# Patient Record
Sex: MALE | Race: BLACK OR AFRICAN AMERICAN | Employment: UNEMPLOYED | URBAN - METROPOLITAN AREA
[De-identification: names, ages, dates, MRNs, and addresses within clinical notes are randomized per-mention and may not be internally consistent; named-entity substitution may affect disease eponyms.]

---
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
## 2022-08-16 ENCOUNTER — OFFICE VISIT (OUTPATIENT)
Dept: FAMILY MEDICINE CLINIC | Age: 22
End: 2022-08-16
Payer: COMMERCIAL

## 2022-08-16 VITALS
HEIGHT: 73 IN | WEIGHT: 195.2 LBS | SYSTOLIC BLOOD PRESSURE: 120 MMHG | TEMPERATURE: 97.3 F | BODY MASS INDEX: 25.87 KG/M2 | OXYGEN SATURATION: 99 % | HEART RATE: 87 BPM | DIASTOLIC BLOOD PRESSURE: 70 MMHG

## 2022-08-16 DIAGNOSIS — Z00.00 ENCOUNTER FOR WELLNESS EXAMINATION: ICD-10-CM

## 2022-08-16 DIAGNOSIS — Z00.00 ROUTINE HEALTH MAINTENANCE: Primary | ICD-10-CM

## 2022-08-16 PROCEDURE — 99203 OFFICE O/P NEW LOW 30 MIN: CPT

## 2022-08-16 PROCEDURE — 1036F TOBACCO NON-USER: CPT

## 2022-08-16 PROCEDURE — G8419 CALC BMI OUT NRM PARAM NOF/U: HCPCS

## 2022-08-16 PROCEDURE — G8427 DOCREV CUR MEDS BY ELIG CLIN: HCPCS

## 2022-08-16 ASSESSMENT — PATIENT HEALTH QUESTIONNAIRE - PHQ9
SUM OF ALL RESPONSES TO PHQ9 QUESTIONS 1 & 2: 0
1. LITTLE INTEREST OR PLEASURE IN DOING THINGS: 0
2. FEELING DOWN, DEPRESSED OR HOPELESS: 0
SUM OF ALL RESPONSES TO PHQ QUESTIONS 1-9: 0

## 2022-08-16 ASSESSMENT — ENCOUNTER SYMPTOMS
COUGH: 0
CONSTIPATION: 0
EYE PAIN: 0
SORE THROAT: 0
VOMITING: 0
BACK PAIN: 0
NAUSEA: 0
ABDOMINAL PAIN: 0
SHORTNESS OF BREATH: 0
DIARRHEA: 0

## 2022-08-16 NOTE — PROGRESS NOTES
Visit Information    Have you changed or started any medications since your last visit including any over-the-counter medicines, vitamins, or herbal medicines? no   Have you stopped taking any of your medications? Is so, why? -  no  Are you having any side effects from any of your medications? - no    Have you seen any other physician or provider since your last visit?  no   Have you had any other diagnostic tests since your last visit?  no   Have you been seen in the emergency room and/or had an admission in a hospital since we last saw you?  no   Have you had your routine dental cleaning in the past 6 months?  no     Do you have an active MyChart account? If no, what is the barrier?   No: Inactive    Patient Care Team:  VENICE La CNP as PCP - General (Certified Nurse Practitioner)  VENICE La CNP (Nurse Practitioner)  VENICE La CNP (Nurse Practitioner)    Medical History Review  Past Medical, Family, and Social History reviewed and does not contribute to the patient presenting condition    Health Maintenance   Topic Date Due    Depression Screen  Never done    HIV screen  Never done    Hepatitis C screen  Never done    COVID-19 Vaccine (2 - Booster for Gianna series) 12/22/2021    Flu vaccine (1) 09/01/2022    DTaP/Tdap/Td vaccine (6 - Td or Tdap) 10/23/2023    Hepatitis A vaccine  Completed    Hepatitis B vaccine  Completed    Hib vaccine  Completed    HPV vaccine  Completed    Varicella vaccine  Completed    Meningococcal (ACWY) vaccine  Completed    Pneumococcal 0-64 years Vaccine  Aged Out

## 2022-08-16 NOTE — PATIENT INSTRUCTIONS
Thank you for letting us take care of you today. We hope all your questions were addressed. If a question was overlooked or something else comes to mind after you return home, please contact a member of your Care Team listed below. Your Care Team at [place] [address] is Team #1  Deanna Maharaj MD (Faculty)  Alannah Calvin MD(Resident)  Ankita Mattson MD (Resident)  Barclay Ormond, DO (Resident)  Adelia Mcgill., MARA Ricardo., LEYDA Romero., KODY Morrison., Darrius Albarado., [facility] [place] office)  Susana Ruiz, [address] (Clinical Practice Manager)  Nikos Ho [facility] (Clinical Pharmacist)     Office phone number: [phone]    If you need to get in right away due to illness, please be advised we have \"Same Day\" appointments available Monday-Friday. Please call us at [phone] option #3 to schedule your \"Same Day\" appointment.

## 2022-08-16 NOTE — PROGRESS NOTES
[address] [facility] - Outpatient Note      Subjective:    Lennox Becker is a 24 y.o. male with  has a past medical history of ADD (attention deficit disorder). Presented to the office today for:  Chief Complaint   Patient presents with    Annual Exam     New patient - est care / labs wanted        HPI  Patient is here to establish care and routine health maintenance. Denies any health problems. Not taking any medications. Smokes weed \"a lot\" 10 g /day, everyday  Denies Alcohol  Has seasonal allergies, Claritin as needed  migraine, motrin helps    Sexually active with 2 female partners, uses condoms  Denies STIs     Covid vaccine 1 dose J&J      Review of Systems   Constitutional:  Negative for chills and fever. HENT:  Negative for ear pain and sore throat. Eyes:  Negative for pain. Respiratory:  Negative for cough and shortness of breath. Cardiovascular:  Negative for chest pain and leg swelling. Gastrointestinal:  Negative for abdominal pain, constipation, diarrhea, nausea and vomiting. Genitourinary:  Negative for dysuria. Musculoskeletal:  Negative for back pain and neck pain. Skin:  Negative for rash. Neurological:  Positive for headaches. The patient has a   Family History   Problem Relation Age of Onset    [de-identified] / [facility]        Objective:    /70 (Site: Left Upper Arm, Position: Sitting, Cuff Size: Large Adult)   Pulse 87   Temp 97.3 °F (36.3 °C) (Temporal)   Ht 6' 1\" (1.854 m)   Wt 195 lb 3.2 oz (88.5 kg)   SpO2 99%   BMI 25.75 kg/m²    BP Readings from Last 3 Encounters:   08/16/22 120/70   12/15/17 139/86   08/01/17 106/64 (15 %, Z = -1.04 /  32 %, Z = -0.47)*     *BP percentiles are based on the 2017 AAP Clinical Practice Guideline for boys       Physical Exam  Vitals and nursing note reviewed. Constitutional:       Appearance: He is not ill-appearing.    HENT:      Head: Normocephalic and atraumatic. Cardiovascular:      Rate and Rhythm: Normal rate. Pulses: Normal pulses. Heart sounds: Normal heart sounds. No murmur heard. No friction rub. No gallop. Pulmonary:      Effort: Pulmonary effort is normal. No respiratory distress. Breath sounds: Normal breath sounds. No wheezing. Abdominal:      General: Abdomen is flat. Bowel sounds are normal.      Palpations: Abdomen is soft. Musculoskeletal:      Right lower leg: No edema. Left lower leg: No edema. Neurological:      Mental Status: He is alert and oriented to person, place, and time. Lab Results   Component Value Date    WBC 6.9 09/15/2018    HGB 13.3 09/15/2018    HCT 40.8 09/15/2018     09/15/2018     09/15/2018    K 5.0 (H) 09/15/2018     09/15/2018    CREATININE 0.82 09/15/2018    BUN 11 09/15/2018    CO2 23 09/15/2018    INR 1.2 09/15/2018     Lab Results   Component Value Date    CALCIUM 8.5 09/21/2015     No results found for: LDLCALC, LDLCHOLESTEROL, LDLDIRECT    Assessment and Plan:    1. Routine health maintenance    - HIV Screen; Future  - Hepatitis C Antibody; Future  - Lipid Panel; Future    Follow up in 6 months. Requested Prescriptions      No prescriptions requested or ordered in this encounter       Medications Discontinued During This Encounter   Medication Reason    acetaminophen (TYLENOL) 325 MG tablet LIST CLEANUP    bacitracin 500 UNIT/GM ointment LIST CLEANUP    benzocaine (ORAJEL) 10 % mucosal gel LIST CLEANUP    ibuprofen (ADVIL;MOTRIN) 600 MG tablet LIST CLEANUP    Lisdexamfetamine Dimesylate (VYVANSE) 20 MG CAPS LIST CLEANUP       Kristie Ring received counseling on the following healthy behaviors: nutrition, exercise and medication adherence    Discussed use,benefit, and side effects of prescribed medications. Barriers to medication compliance addressed. All patient questions answered. Pt voiced understanding.      Return in about 6 months (around 2/16/2023) for lab results . Disclaimer: Some orall of this note was transcribed using voice-recognition software. This may cause typographical errors occasionally. Although all effort is made to fix these errors, please do not hesitate to contact our office if there Yamila Robbie concern with the understanding of this note.

## 2022-09-01 ENCOUNTER — APPOINTMENT (OUTPATIENT)
Dept: GENERAL RADIOLOGY | Age: 22
DRG: 113 | End: 2022-09-01
Payer: COMMERCIAL

## 2022-09-01 ENCOUNTER — ANESTHESIA (OUTPATIENT)
Dept: OPERATING ROOM | Age: 22
DRG: 113 | End: 2022-09-01
Payer: COMMERCIAL

## 2022-09-01 ENCOUNTER — APPOINTMENT (OUTPATIENT)
Dept: CT IMAGING | Age: 22
DRG: 113 | End: 2022-09-01
Payer: COMMERCIAL

## 2022-09-01 ENCOUNTER — HOSPITAL ENCOUNTER (INPATIENT)
Age: 22
LOS: 4 days | Discharge: HOME | DRG: 113 | End: 2022-09-05
Attending: EMERGENCY MEDICINE | Admitting: INTERNAL MEDICINE
Payer: COMMERCIAL

## 2022-09-01 ENCOUNTER — HOSPITAL ENCOUNTER (EMERGENCY)
Age: 22
Discharge: HOME | DRG: 113 | End: 2022-09-01
Attending: EMERGENCY MEDICINE
Payer: COMMERCIAL

## 2022-09-01 ENCOUNTER — ANESTHESIA EVENT (OUTPATIENT)
Dept: OPERATING ROOM | Age: 22
DRG: 113 | End: 2022-09-01
Payer: COMMERCIAL

## 2022-09-01 VITALS
BODY MASS INDEX: 25.73 KG/M2 | WEIGHT: 195 LBS | OXYGEN SATURATION: 96 % | HEART RATE: 63 BPM | RESPIRATION RATE: 16 BRPM | SYSTOLIC BLOOD PRESSURE: 136 MMHG | DIASTOLIC BLOOD PRESSURE: 85 MMHG | TEMPERATURE: 97.8 F

## 2022-09-01 DIAGNOSIS — J05.10 ACUTE EPIGLOTTITIS WITHOUT AIRWAY OBSTRUCTION: Primary | ICD-10-CM

## 2022-09-01 DIAGNOSIS — J02.9 SORE THROAT: Primary | ICD-10-CM

## 2022-09-01 PROBLEM — J05.11 ACUTE EPIGLOTTITIS WITH AIRWAY OBSTRUCTION: Status: ACTIVE | Noted: 2022-09-01

## 2022-09-01 LAB
ABSOLUTE EOS #: 0 K/UL (ref 0–0.4)
ABSOLUTE IMMATURE GRANULOCYTE: 0 K/UL (ref 0–0.3)
ABSOLUTE LYMPH #: 0.49 K/UL (ref 1–4.8)
ABSOLUTE MONO #: 1.97 K/UL (ref 0.1–1.4)
ALLEN TEST: POSITIVE
ANION GAP SERPL CALCULATED.3IONS-SCNC: 11 MMOL/L (ref 9–17)
BASOPHILS # BLD: 0 % (ref 0–2)
BASOPHILS ABSOLUTE: 0 K/UL (ref 0–0.2)
BUN BLDV-MCNC: 11 MG/DL (ref 6–20)
CALCIUM SERPL-MCNC: 9.5 MG/DL (ref 8.6–10.4)
CHLORIDE BLD-SCNC: 102 MMOL/L (ref 98–107)
CO2: 24 MMOL/L (ref 20–31)
CREAT SERPL-MCNC: 0.69 MG/DL (ref 0.7–1.2)
EOSINOPHILS RELATIVE PERCENT: 0 % (ref 1–4)
FIO2: 40
GFR AFRICAN AMERICAN: >60 ML/MIN
GFR NON-AFRICAN AMERICAN: >60 ML/MIN
GFR SERPL CREATININE-BSD FRML MDRD: ABNORMAL ML/MIN/{1.73_M2}
GLUCOSE BLD-MCNC: 107 MG/DL (ref 70–99)
GLUCOSE BLD-MCNC: 125 MG/DL (ref 74–100)
HCT VFR BLD CALC: 40.7 % (ref 40.7–50.3)
HEMOGLOBIN: 14.4 G/DL (ref 13–17)
IMMATURE GRANULOCYTES: 0 %
LYMPHOCYTES # BLD: 2 % (ref 25–45)
MCH RBC QN AUTO: 30.1 PG (ref 25.2–33.5)
MCHC RBC AUTO-ENTMCNC: 35.4 G/DL (ref 28.4–34.8)
MCV RBC AUTO: 85 FL (ref 82.6–102.9)
MODE: ABNORMAL
MONOCYTES # BLD: 8 % (ref 2–8)
MORPHOLOGY: NORMAL
NRBC AUTOMATED: 0 PER 100 WBC
O2 DEVICE/FLOW/%: ABNORMAL
PDW BLD-RTO: 13.5 % (ref 11.8–14.4)
PLATELET # BLD: 336 K/UL (ref 138–453)
PMV BLD AUTO: 10.7 FL (ref 8.1–13.5)
POC HCO3: 25.9 MMOL/L (ref 21–28)
POC O2 SATURATION: 100 % (ref 94–98)
POC PCO2: 42.4 MM HG (ref 35–48)
POC PH: 7.39 (ref 7.35–7.45)
POC PO2: 187.4 MM HG (ref 83–108)
POSITIVE BASE EXCESS, ART: 1 (ref 0–3)
POTASSIUM SERPL-SCNC: 4.1 MMOL/L (ref 3.7–5.3)
RBC # BLD: 4.79 M/UL (ref 4.21–5.77)
S PYO AG THROAT QL: NEGATIVE
SAMPLE SITE: ABNORMAL
SEG NEUTROPHILS: 90 % (ref 34–64)
SEGMENTED NEUTROPHILS ABSOLUTE COUNT: 22.14 K/UL (ref 1.8–7.7)
SODIUM BLD-SCNC: 137 MMOL/L (ref 135–144)
SOURCE: NORMAL
WBC # BLD: 24.6 K/UL (ref 4.5–13.5)

## 2022-09-01 PROCEDURE — 6370000000 HC RX 637 (ALT 250 FOR IP): Performed by: STUDENT IN AN ORGANIZED HEALTH CARE EDUCATION/TRAINING PROGRAM

## 2022-09-01 PROCEDURE — 74018 RADEX ABDOMEN 1 VIEW: CPT

## 2022-09-01 PROCEDURE — 2580000003 HC RX 258: Performed by: OTOLARYNGOLOGY

## 2022-09-01 PROCEDURE — 31515 LARYNGOSCOPY FOR ASPIRATION: CPT | Performed by: OTOLARYNGOLOGY

## 2022-09-01 PROCEDURE — 2580000003 HC RX 258: Performed by: STUDENT IN AN ORGANIZED HEALTH CARE EDUCATION/TRAINING PROGRAM

## 2022-09-01 PROCEDURE — 99253 IP/OBS CNSLTJ NEW/EST LOW 45: CPT | Performed by: OTOLARYNGOLOGY

## 2022-09-01 PROCEDURE — 0CJS8ZZ INSPECTION OF LARYNX, VIA NATURAL OR ARTIFICIAL OPENING ENDOSCOPIC: ICD-10-PCS | Performed by: OTOLARYNGOLOGY

## 2022-09-01 PROCEDURE — 96375 TX/PRO/DX INJ NEW DRUG ADDON: CPT

## 2022-09-01 PROCEDURE — 6360000002 HC RX W HCPCS: Performed by: STUDENT IN AN ORGANIZED HEALTH CARE EDUCATION/TRAINING PROGRAM

## 2022-09-01 PROCEDURE — 82947 ASSAY GLUCOSE BLOOD QUANT: CPT

## 2022-09-01 PROCEDURE — 3700000001 HC ADD 15 MINUTES (ANESTHESIA): Performed by: OTOLARYNGOLOGY

## 2022-09-01 PROCEDURE — 2500000003 HC RX 250 WO HCPCS: Performed by: OTOLARYNGOLOGY

## 2022-09-01 PROCEDURE — 3600000004 HC SURGERY LEVEL 4 BASE: Performed by: OTOLARYNGOLOGY

## 2022-09-01 PROCEDURE — 99283 EMERGENCY DEPT VISIT LOW MDM: CPT

## 2022-09-01 PROCEDURE — 96365 THER/PROPH/DIAG IV INF INIT: CPT

## 2022-09-01 PROCEDURE — 99285 EMERGENCY DEPT VISIT HI MDM: CPT

## 2022-09-01 PROCEDURE — 2700000000 HC OXYGEN THERAPY PER DAY

## 2022-09-01 PROCEDURE — 2709999900 HC NON-CHARGEABLE SUPPLY: Performed by: OTOLARYNGOLOGY

## 2022-09-01 PROCEDURE — 82803 BLOOD GASES ANY COMBINATION: CPT

## 2022-09-01 PROCEDURE — 85025 COMPLETE CBC W/AUTO DIFF WBC: CPT

## 2022-09-01 PROCEDURE — 87040 BLOOD CULTURE FOR BACTERIA: CPT

## 2022-09-01 PROCEDURE — 6360000002 HC RX W HCPCS

## 2022-09-01 PROCEDURE — 2500000003 HC RX 250 WO HCPCS: Performed by: NURSE ANESTHETIST, CERTIFIED REGISTERED

## 2022-09-01 PROCEDURE — 6360000002 HC RX W HCPCS: Performed by: NURSE ANESTHETIST, CERTIFIED REGISTERED

## 2022-09-01 PROCEDURE — 36415 COLL VENOUS BLD VENIPUNCTURE: CPT

## 2022-09-01 PROCEDURE — 3600000014 HC SURGERY LEVEL 4 ADDTL 15MIN: Performed by: OTOLARYNGOLOGY

## 2022-09-01 PROCEDURE — 2000000000 HC ICU R&B

## 2022-09-01 PROCEDURE — 94761 N-INVAS EAR/PLS OXIMETRY MLT: CPT

## 2022-09-01 PROCEDURE — 2580000003 HC RX 258: Performed by: NURSE ANESTHETIST, CERTIFIED REGISTERED

## 2022-09-01 PROCEDURE — 36600 WITHDRAWAL OF ARTERIAL BLOOD: CPT

## 2022-09-01 PROCEDURE — 3700000000 HC ANESTHESIA ATTENDED CARE: Performed by: OTOLARYNGOLOGY

## 2022-09-01 PROCEDURE — 94002 VENT MGMT INPAT INIT DAY: CPT

## 2022-09-01 PROCEDURE — 80048 BASIC METABOLIC PNL TOTAL CA: CPT

## 2022-09-01 PROCEDURE — 84478 ASSAY OF TRIGLYCERIDES: CPT

## 2022-09-01 PROCEDURE — 87880 STREP A ASSAY W/OPTIC: CPT

## 2022-09-01 RX ORDER — IBUPROFEN 600 MG/1
600 TABLET ORAL EVERY 8 HOURS PRN
Qty: 21 TABLET | Refills: 0 | Status: SHIPPED | OUTPATIENT
Start: 2022-09-01 | End: 2022-09-08

## 2022-09-01 RX ORDER — SODIUM CHLORIDE 9 MG/ML
INJECTION, SOLUTION INTRAVENOUS PRN
Status: DISCONTINUED | OUTPATIENT
Start: 2022-09-01 | End: 2022-09-05 | Stop reason: HOSPADM

## 2022-09-01 RX ORDER — ROCURONIUM BROMIDE 10 MG/ML
INJECTION, SOLUTION INTRAVENOUS PRN
Status: DISCONTINUED | OUTPATIENT
Start: 2022-09-01 | End: 2022-09-01 | Stop reason: SDUPTHER

## 2022-09-01 RX ORDER — ALBUTEROL SULFATE 2.5 MG/3ML
2.5 SOLUTION RESPIRATORY (INHALATION) EVERY 4 HOURS PRN
Status: DISCONTINUED | OUTPATIENT
Start: 2022-09-01 | End: 2022-09-04

## 2022-09-01 RX ORDER — SODIUM CHLORIDE 9 MG/ML
INJECTION, SOLUTION INTRAVENOUS CONTINUOUS PRN
Status: DISCONTINUED | OUTPATIENT
Start: 2022-09-01 | End: 2022-09-01 | Stop reason: SDUPTHER

## 2022-09-01 RX ORDER — SUCCINYLCHOLINE/SOD CL,ISO/PF 100 MG/5ML
SYRINGE (ML) INTRAVENOUS PRN
Status: DISCONTINUED | OUTPATIENT
Start: 2022-09-01 | End: 2022-09-01 | Stop reason: SDUPTHER

## 2022-09-01 RX ORDER — DEXAMETHASONE SODIUM PHOSPHATE 10 MG/ML
10 INJECTION INTRAMUSCULAR; INTRAVENOUS EVERY 8 HOURS
Status: DISCONTINUED | OUTPATIENT
Start: 2022-09-03 | End: 2022-09-05 | Stop reason: HOSPADM

## 2022-09-01 RX ORDER — PROPOFOL 10 MG/ML
INJECTION, EMULSION INTRAVENOUS PRN
Status: DISCONTINUED | OUTPATIENT
Start: 2022-09-01 | End: 2022-09-01 | Stop reason: SDUPTHER

## 2022-09-01 RX ORDER — ONDANSETRON 4 MG/1
4 TABLET, ORALLY DISINTEGRATING ORAL EVERY 8 HOURS PRN
Status: DISCONTINUED | OUTPATIENT
Start: 2022-09-01 | End: 2022-09-05 | Stop reason: HOSPADM

## 2022-09-01 RX ORDER — FENTANYL CITRATE 50 UG/ML
INJECTION, SOLUTION INTRAMUSCULAR; INTRAVENOUS PRN
Status: DISCONTINUED | OUTPATIENT
Start: 2022-09-01 | End: 2022-09-01 | Stop reason: SDUPTHER

## 2022-09-01 RX ORDER — PROPOFOL 10 MG/ML
INJECTION, EMULSION INTRAVENOUS
Status: COMPLETED
Start: 2022-09-01 | End: 2022-09-01

## 2022-09-01 RX ORDER — ENOXAPARIN SODIUM 100 MG/ML
40 INJECTION SUBCUTANEOUS DAILY
Status: DISCONTINUED | OUTPATIENT
Start: 2022-09-02 | End: 2022-09-05 | Stop reason: HOSPADM

## 2022-09-01 RX ORDER — MAGNESIUM HYDROXIDE 1200 MG/15ML
LIQUID ORAL CONTINUOUS PRN
Status: COMPLETED | OUTPATIENT
Start: 2022-09-01 | End: 2022-09-01

## 2022-09-01 RX ORDER — GLYCOPYRROLATE 0.2 MG/ML
INJECTION INTRAMUSCULAR; INTRAVENOUS PRN
Status: DISCONTINUED | OUTPATIENT
Start: 2022-09-01 | End: 2022-09-01 | Stop reason: SDUPTHER

## 2022-09-01 RX ORDER — DEXAMETHASONE SODIUM PHOSPHATE 10 MG/ML
10 INJECTION INTRAMUSCULAR; INTRAVENOUS ONCE
Status: COMPLETED | OUTPATIENT
Start: 2022-09-01 | End: 2022-09-01

## 2022-09-01 RX ORDER — SODIUM CHLORIDE 0.9 % (FLUSH) 0.9 %
5-40 SYRINGE (ML) INJECTION PRN
Status: DISCONTINUED | OUTPATIENT
Start: 2022-09-01 | End: 2022-09-05 | Stop reason: HOSPADM

## 2022-09-01 RX ORDER — PROPOFOL 10 MG/ML
5-50 INJECTION, EMULSION INTRAVENOUS CONTINUOUS
Status: DISCONTINUED | OUTPATIENT
Start: 2022-09-01 | End: 2022-09-02

## 2022-09-01 RX ORDER — ACETAMINOPHEN 500 MG
1000 TABLET ORAL 3 TIMES DAILY
Qty: 42 TABLET | Refills: 0 | Status: SHIPPED | OUTPATIENT
Start: 2022-09-01 | End: 2022-09-08

## 2022-09-01 RX ORDER — ACETAMINOPHEN 650 MG/1
650 SUPPOSITORY RECTAL EVERY 6 HOURS PRN
Status: DISCONTINUED | OUTPATIENT
Start: 2022-09-01 | End: 2022-09-05 | Stop reason: HOSPADM

## 2022-09-01 RX ORDER — SODIUM CHLORIDE, SODIUM LACTATE, POTASSIUM CHLORIDE, CALCIUM CHLORIDE 600; 310; 30; 20 MG/100ML; MG/100ML; MG/100ML; MG/100ML
INJECTION, SOLUTION INTRAVENOUS CONTINUOUS PRN
Status: DISCONTINUED | OUTPATIENT
Start: 2022-09-01 | End: 2022-09-01 | Stop reason: SDUPTHER

## 2022-09-01 RX ORDER — PROPOFOL 10 MG/ML
INJECTION, EMULSION INTRAVENOUS CONTINUOUS PRN
Status: DISCONTINUED | OUTPATIENT
Start: 2022-09-01 | End: 2022-09-01 | Stop reason: SDUPTHER

## 2022-09-01 RX ORDER — KETOROLAC TROMETHAMINE 15 MG/ML
15 INJECTION, SOLUTION INTRAMUSCULAR; INTRAVENOUS ONCE
Status: COMPLETED | OUTPATIENT
Start: 2022-09-01 | End: 2022-09-01

## 2022-09-01 RX ORDER — ONDANSETRON 2 MG/ML
4 INJECTION INTRAMUSCULAR; INTRAVENOUS EVERY 6 HOURS PRN
Status: DISCONTINUED | OUTPATIENT
Start: 2022-09-01 | End: 2022-09-05 | Stop reason: HOSPADM

## 2022-09-01 RX ORDER — MIDAZOLAM HYDROCHLORIDE 1 MG/ML
INJECTION INTRAMUSCULAR; INTRAVENOUS PRN
Status: DISCONTINUED | OUTPATIENT
Start: 2022-09-01 | End: 2022-09-01 | Stop reason: SDUPTHER

## 2022-09-01 RX ORDER — SODIUM CHLORIDE 0.9 % (FLUSH) 0.9 %
5-40 SYRINGE (ML) INJECTION EVERY 12 HOURS SCHEDULED
Status: DISCONTINUED | OUTPATIENT
Start: 2022-09-01 | End: 2022-09-05 | Stop reason: HOSPADM

## 2022-09-01 RX ORDER — MIDAZOLAM HYDROCHLORIDE 2 MG/2ML
2 INJECTION, SOLUTION INTRAMUSCULAR; INTRAVENOUS ONCE
Status: COMPLETED | OUTPATIENT
Start: 2022-09-01 | End: 2022-09-01

## 2022-09-01 RX ORDER — ACETAMINOPHEN 325 MG/1
650 TABLET ORAL EVERY 6 HOURS PRN
Status: DISCONTINUED | OUTPATIENT
Start: 2022-09-01 | End: 2022-09-05 | Stop reason: HOSPADM

## 2022-09-01 RX ORDER — POLYETHYLENE GLYCOL 3350 17 G/17G
17 POWDER, FOR SOLUTION ORAL DAILY PRN
Status: DISCONTINUED | OUTPATIENT
Start: 2022-09-01 | End: 2022-09-05 | Stop reason: HOSPADM

## 2022-09-01 RX ORDER — LIDOCAINE HYDROCHLORIDE AND EPINEPHRINE BITARTRATE 20; .01 MG/ML; MG/ML
INJECTION, SOLUTION SUBCUTANEOUS PRN
Status: DISCONTINUED | OUTPATIENT
Start: 2022-09-01 | End: 2022-09-01 | Stop reason: ALTCHOICE

## 2022-09-01 RX ADMIN — KETOROLAC TROMETHAMINE 15 MG: 15 INJECTION, SOLUTION INTRAMUSCULAR; INTRAVENOUS at 18:11

## 2022-09-01 RX ADMIN — MIDAZOLAM 2 MG: 1 INJECTION INTRAMUSCULAR; INTRAVENOUS at 19:38

## 2022-09-01 RX ADMIN — SODIUM CHLORIDE: 9 INJECTION, SOLUTION INTRAVENOUS at 23:56

## 2022-09-01 RX ADMIN — Medication 2 MG/HR: at 21:55

## 2022-09-01 RX ADMIN — PROPOFOL 200 MG: 10 INJECTION, EMULSION INTRAVENOUS at 19:48

## 2022-09-01 RX ADMIN — PROPOFOL 80 MCG/KG/MIN: 10 INJECTION, EMULSION INTRAVENOUS at 23:30

## 2022-09-01 RX ADMIN — DEXAMETHASONE SODIUM PHOSPHATE 10 MG: 10 INJECTION, SOLUTION INTRAMUSCULAR; INTRAVENOUS at 19:03

## 2022-09-01 RX ADMIN — DEXAMETHASONE SODIUM PHOSPHATE 10 MG: 10 INJECTION INTRAMUSCULAR; INTRAVENOUS at 07:39

## 2022-09-01 RX ADMIN — ROCURONIUM BROMIDE 50 MG: 10 INJECTION, SOLUTION INTRAVENOUS at 19:51

## 2022-09-01 RX ADMIN — GLYCOPYRROLATE 0.4 MG: 0.2 INJECTION INTRAMUSCULAR; INTRAVENOUS at 19:48

## 2022-09-01 RX ADMIN — MIDAZOLAM HYDROCHLORIDE 2 MG: 1 INJECTION, SOLUTION INTRAMUSCULAR; INTRAVENOUS at 21:56

## 2022-09-01 RX ADMIN — Medication 100 MG: at 19:48

## 2022-09-01 RX ADMIN — SODIUM CHLORIDE, POTASSIUM CHLORIDE, SODIUM LACTATE AND CALCIUM CHLORIDE: 600; 310; 30; 20 INJECTION, SOLUTION INTRAVENOUS at 19:45

## 2022-09-01 RX ADMIN — AMPICILLIN SODIUM AND SULBACTAM SODIUM 3000 MG: 2; 1 INJECTION, POWDER, FOR SOLUTION INTRAMUSCULAR; INTRAVENOUS at 19:00

## 2022-09-01 RX ADMIN — IBUPROFEN 600 MG: 100 SUSPENSION ORAL at 07:08

## 2022-09-01 RX ADMIN — SODIUM CHLORIDE: 0.9 INJECTION, SOLUTION INTRAVENOUS at 19:36

## 2022-09-01 RX ADMIN — FENTANYL CITRATE 100 MCG: 50 INJECTION, SOLUTION INTRAMUSCULAR; INTRAVENOUS at 20:35

## 2022-09-01 RX ADMIN — PROPOFOL 100 MCG/KG/MIN: 10 INJECTION, EMULSION INTRAVENOUS at 20:46

## 2022-09-01 ASSESSMENT — PAIN - FUNCTIONAL ASSESSMENT
PAIN_FUNCTIONAL_ASSESSMENT: 0-10
PAIN_FUNCTIONAL_ASSESSMENT: 0-10

## 2022-09-01 ASSESSMENT — PAIN DESCRIPTION - DESCRIPTORS
DESCRIPTORS: SHARP
DESCRIPTORS: SORE

## 2022-09-01 ASSESSMENT — PAIN SCALES - GENERAL
PAINLEVEL_OUTOF10: 9
PAINLEVEL_OUTOF10: 10

## 2022-09-01 ASSESSMENT — PULMONARY FUNCTION TESTS: PIF_VALUE: 19

## 2022-09-01 ASSESSMENT — PAIN DESCRIPTION - LOCATION
LOCATION: THROAT
LOCATION: THROAT

## 2022-09-01 NOTE — ED NOTES
Dr. Jairo Jean at bedside to have informed consent form signed for laryngoscopy for epiglottitis. RN to witness.            Gabriella Dukes RN  09/01/22 2005

## 2022-09-01 NOTE — ED PROVIDER NOTES
[facility] ED  Emergency Department Encounter  Emergency Medicine Resident     Pt Name: Kathryn Yost  MRN: [id number]  [place] 2000  Date of evaluation: 9/1/22  PCP:  VENICE Chase CNP    CHIEF COMPLAINT       Chief Complaint   Patient presents with    Pharyngitis       HISTORY OFPRESENT ILLNESS  (Location/Symptom, Timing/Onset, Context/Setting, Quality, Duration, Modifying Lucvishal Allen.)      Kathryn Yost is a 24 y.o. male with ports of sore throat x1 day, pain with swallowing. Patient states that he started having a sore throat yesterday. Patient is not taking any medications today although did try Motrin yesterday. Tolerating oral intake. Mild to moderate in severity. Denies any chest pain, shortness of breath, abdominal pain, nausea, vomiting, difficulty swallowing, inability to swallow, does have pain with swallowing although no difficulty. Denies any neck pain. Denies any fevers, chills. Denies any eye pain, headache. Denies any trauma to the neck. Sick contacts. Denies allergies. PAST MEDICAL / SURGICAL / SOCIAL / FAMILY HISTORY      has a past medical history of ADD (attention deficit disorder). has a past surgical history that includes Dental surgery (9/23/15); Circumcision; Mouth surgery; and laryngoscopy (N/A, 9/1/2022). Social:  reports that he has never smoked. He has never used smokeless tobacco. He reports that he does not drink alcohol and does not use drugs. Family Hx:   Family History   Problem Relation Age of Onset    Ale Boles / Jose Carlos Ellsworth Mother         Allergies:  Patient has no known allergies. Home Medications:  Prior to Admission medications    Medication Sig Start Date End Date Taking?  Authorizing Provider   acetaminophen (TYLENOL) 500 MG tablet Take 2 tablets by mouth 3 times daily for 7 days 9/1/22 9/8/22 Yes Janine Gibbs,    ibuprofen (IBU) 600 MG tablet Take 1 tablet by mouth every 8 hours as needed for Pain 9/1/22 9/8/22 Yes Laurel Valle, DO       REVIEW OFSYSTEMS    (2-9 systems for level 4, 10 or more for level 5)      Positive: Sore throat, pain with swallowing    Negative: Fevers, chills, headache, eye pain, ear pain, difficulty swallowing, chest pain, shortness of breath, abdominal pain, nausea, vomiting, dysuria, diarrhea, constipation, numbness, tingling, pain with movement of neck, sublingual swelling, submandibular swelling. PHYSICAL EXAM   (up to 7 for level 4, 8 or more forlevel 5)      INITIAL VITALS:   Vitals:    09/01/22 0638   BP: 136/85   Pulse: 63   Resp: 16   Temp: 97.8 °F (36.6 °C)   SpO2: 96%        Physical Exam  Vitals and nursing note reviewed. Constitutional:       General: He is not in acute distress. Appearance: He is not ill-appearing, toxic-appearing or diaphoretic. Comments: Speaking full sentences, alert, oriented, answering all questions appropriately, in no acute distress. Nontoxic, non-lethargic. HENT:      Head: Normocephalic and atraumatic. Nose: Nose normal.      Mouth/Throat:      Lips: Pink. Mouth: Mucous membranes are moist.      Pharynx: Oropharynx is clear. Tonsils: No tonsillar exudate. Comments: No erythema noted, no exudate, no edema noted. Patient speaking in full sentences. No sublingual swelling, no submandibular swelling. Able to move neck through full range of motion with no pain or rigidity. No obvious abscesses noted. No lesions lesions noted within the oropharynx. Easily Tolerating oral secretions. Eyes:      Pupils: Pupils are equal, round, and reactive to light. Cardiovascular:      Rate and Rhythm: Normal rate and regular rhythm. Heart sounds: No murmur heard. No friction rub. No gallop. Pulmonary:      Effort: Pulmonary effort is normal. No respiratory distress. Breath sounds: Normal breath sounds. No wheezing. Abdominal:      General: Abdomen is flat. There is no distension.       Palpations: Abdomen is soft. Tenderness: There is no abdominal tenderness. Musculoskeletal:      Cervical back: Normal range of motion. Skin:     General: Skin is warm and dry. Neurological:      Mental Status: He is alert and oriented to person, place, and time. Psychiatric:         Mood and Affect: Mood normal.         Behavior: Behavior normal.       DIFFERENTIAL  DIAGNOSIS       Initial MDM/Plan: 24 y.o. male who presents with reports of sore throat x1 day. Upon my initial examination patient is resting comfortably in the cot, in no acute distress, no respiratory distress, speaking full sentences. Vital signs upon arrival are within normal limits including patient being afebrile, nontachycardic, nontachypneic, nontoxic-appearing. Patient has a GCS of 15 is alert, oriented, answering all questions appropriately. Patient's physical exam unremarkable, no erythema, no exudate, no edema noted. No sublingual swelling, no submandibular swelling. Able to move neck through full range of motion with no pain or rigidity. Speaking in full sentences. Tolerating oral secretions easily. We will plan for strep swab although low suspicion for strep at this time. Decadron for symptomatic relief. Motrin for symptomatic relief. Plan for discharge pending laboratory work-up. Strep negative. We will plan for discharge with strict return precautions. Strict return precautions provided. Patient expresses understanding of discharge instructions and is able to repeat strict return precautions back to me. Patient discharged in stable condition after remained vitally stable throughout emergency department stay. DIAGNOSTIC RESULTS / EMERGENCYDEPARTMENT COURSE / MDM     LABS:  Labs Reviewed   STREP SCREEN GROUP A THROAT         PROCEDURES:  None    CONSULTS:  None      FINAL IMPRESSION      1.  Sore throat          DISPOSITION / PLAN     DISPOSITION Decision To Discharge 09/01/2022 07:30:45 AM      PATIENT REFERRED TO:  Aspen Shipley, APRN - [address]  [facility] [address]  [address] ED  [address] [facility] [address] [phone]    As needed, If symptoms worsen    DISCHARGE MEDICATIONS:  There are no discharge medications for this patient.       Ale Schultz DO  Emergency Medicine Resident    (Please note that portions of this note were completed with a voice recognition program.Efforts were made to edit the dictations but occasionally words are mis-transcribed.)        Ale Schultz DO  Resident  09/02/22 2208
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
## 2022-09-01 NOTE — DISCHARGE INSTRUCTIONS
Consuelo Wu!!! On behalf of the Emergency Department staff at [facility]. [facility] Emergency Department, I would like to thank you for giving Jackelin Arevalo the opportunity to address your health care needs and concerns. We hope that during your visit, our service was delivered in a professional and caring manner. Please keep Jackelin Arevalo in mind as we walk with you down the path to your own personal wellness. Please expect an automated phone call from [phone] so we can ask a few questions about your health and progress. Based on your answers, a clinician may call you back to offer help and instructions. If you notice any concerning symptoms please return to the ER immediately. These can include but are not limited to: fevers, chills, shortness of breath, vomiting, weakness of the extremities, changes in your mental status, numbness, pale extremities, or chest pain. SUMMARY OF YOUR VISIT    Today you were seen due to concerns for sore throat. We provided you with a one-time steroid which should help with your discomfort. Provide you a written prescription for Tylenol Motrin. You follow-up with your primary care provider. I recommend you follow-up with your primary care provider. You can return the emergency department as needed symptoms worsen including vomiting, fevers, chills, worsening pain, neck pain, inability move your neck, inability swallow, or any other concerns. Your strep throat swab was negative.

## 2022-09-01 NOTE — ED PROVIDER NOTES
[address] [facility]     Emergency Department     Faculty Attestation    I performed a history and physical examination of the patient and discussed management with the resident. I have reviewed and agree with the residents findings including all diagnostic interpretations, and treatment plans as written. Any areas of disagreement are noted on the chart. I was personally present for the key portions of any procedures. I have documented in the chart those procedures where I was not present during the key portions. I have reviewed the emergency nurses triage note. I agree with the chief complaint, past medical history, past surgical history, allergies, medications, social and family history as documented unless otherwise noted below. Documentation of the HPI, Physical Exam and Medical Decision Making performed by [email or web address] is based on my personal performance of the HPI, PE and MDM. For Physician Assistant/ Nurse Practitioner cases/documentation I have personally evaluated this patient and have completed at least one if not all key elements of the E/M (history, physical exam, and MDM). Additional findings are as noted. Patient with sore throat, was seen earlier in the ER but has been progressively getting worse over the last few hours. Patient feels pulling in the back of his throat. And is very painful to swallow and he is spitting out his secretions. Patient brought back into the emergency room. Patient on exam is hot potato voice, no respiratory distress spitting out his saliva. On exam he does have an addendum edematous epiglottis that is swollen and able to visualize posterior to the uvula. Concern for epiglottitis. Will consult ENT start antibiotics. Dose of steroids. And anticipate admission. CT scan.     Darl Opitz, D.O, M.P.H  Attending Emergency Medicine Physician         Darl Opitz,   09/01/22 1836      7:03 PM EDT  Spoke with Dr. [place] with ENT at the bedside and plan for ICU admission, and patient to go to the OR for intubation       Basilia Robles DO  09/01/22 9173

## 2022-09-01 NOTE — ED PROVIDER NOTES
[facility]     Emergency Department     Faculty Attestation    I performed a history and physical examination of the patient and discussed management with the resident. I have reviewed and agree with the residents findings including all diagnostic interpretations, and treatment plans as written. Any areas of disagreement are noted on the chart. I was personally present for the key portions of any procedures. I have documented in the chart those procedures where I was not present during the key portions. I have reviewed the emergency nurses triage note. I agree with the chief complaint, past medical history, past surgical history, allergies, medications, social and family history as documented unless otherwise noted below. Documentation of the HPI, Physical Exam and Medical Decision Making performed by [email or web address] is based on my personal performance of the HPI, PE and MDM. For Physician Assistant/ Nurse Practitioner cases/documentation I have personally evaluated this patient and have completed at least one if not all key elements of the E/M (history, physical exam, and MDM). Additional findings are as noted. 25 yo M c/o runny nose, sore throat, no fever no vomit,   Pe vss gcs 15 roula sinus mucosal edema, posterior pharynx clear, no exudate, no tonsillar asymmetry, neck supple with full rom ,    Strep -, suspect viral uri, tolerating liquids,     EKG Interpretation    Interpreted by me      CRITICAL CARE: There was a high probability of clinically significant/life threatening deterioration in this patient's condition which required my urgent intervention. Total critical care time was 0 minutes. This excludes any time for separately reportable procedures.        2500 Tylor Tomas, DO  09/01/22 8441 Carline Desai, DO  09/01/22 9565

## 2022-09-01 NOTE — ED NOTES
Pt presents to the ED with c/o a sore throat and painful with swallowing since yesterday. NAD. RR even and unlabored. Call light within reach.       Cary Doyle RN  09/01/22 5525

## 2022-09-02 ENCOUNTER — APPOINTMENT (OUTPATIENT)
Dept: GENERAL RADIOLOGY | Age: 22
DRG: 113 | End: 2022-09-02
Payer: COMMERCIAL

## 2022-09-02 ENCOUNTER — APPOINTMENT (OUTPATIENT)
Dept: CT IMAGING | Age: 22
DRG: 113 | End: 2022-09-02
Payer: COMMERCIAL

## 2022-09-02 LAB
ABSOLUTE EOS #: 0 K/UL (ref 0–0.4)
ABSOLUTE IMMATURE GRANULOCYTE: 0 K/UL (ref 0–0.3)
ABSOLUTE LYMPH #: 1.25 K/UL (ref 1–4.8)
ABSOLUTE MONO #: 1.56 K/UL (ref 0.1–1.4)
ADENOVIRUS PCR: NOT DETECTED
ALLEN TEST: POSITIVE
ANION GAP SERPL CALCULATED.3IONS-SCNC: 9 MMOL/L (ref 9–17)
BASOPHILS # BLD: 0 % (ref 0–2)
BASOPHILS ABSOLUTE: 0 K/UL (ref 0–0.2)
BORDETELLA PARAPERTUSSIS: NOT DETECTED
BORDETELLA PERTUSSIS PCR: NOT DETECTED
BUN BLDV-MCNC: 12 MG/DL (ref 6–20)
CALCIUM SERPL-MCNC: 9.4 MG/DL (ref 8.6–10.4)
CHLAMYDIA PNEUMONIAE BY PCR: NOT DETECTED
CHLORIDE BLD-SCNC: 104 MMOL/L (ref 98–107)
CO2: 26 MMOL/L (ref 20–31)
CORONAVIRUS 229E PCR: NOT DETECTED
CORONAVIRUS HKU1 PCR: NOT DETECTED
CORONAVIRUS NL63 PCR: NOT DETECTED
CORONAVIRUS OC43 PCR: NOT DETECTED
CREAT SERPL-MCNC: 0.76 MG/DL (ref 0.7–1.2)
EOSINOPHILS RELATIVE PERCENT: 0 % (ref 1–4)
FIO2: 30
GFR AFRICAN AMERICAN: >60 ML/MIN
GFR NON-AFRICAN AMERICAN: >60 ML/MIN
GFR SERPL CREATININE-BSD FRML MDRD: ABNORMAL ML/MIN/{1.73_M2}
GLUCOSE BLD-MCNC: 121 MG/DL (ref 74–100)
GLUCOSE BLD-MCNC: 126 MG/DL (ref 70–99)
HCT VFR BLD CALC: 39.6 % (ref 40.7–50.3)
HEMOGLOBIN: 13 G/DL (ref 13–17)
HUMAN METAPNEUMOVIRUS PCR: NOT DETECTED
IMMATURE GRANULOCYTES: 0 %
INFLUENZA A BY PCR: NOT DETECTED
INFLUENZA B BY PCR: NOT DETECTED
LYMPHOCYTES # BLD: 4 % (ref 25–45)
MCH RBC QN AUTO: 28.5 PG (ref 25.2–33.5)
MCHC RBC AUTO-ENTMCNC: 32.8 G/DL (ref 28.4–34.8)
MCV RBC AUTO: 86.8 FL (ref 82.6–102.9)
MODE: ABNORMAL
MONOCYTES # BLD: 5 % (ref 2–8)
MORPHOLOGY: NORMAL
MYCOPLASMA PNEUMONIAE PCR: NOT DETECTED
NRBC AUTOMATED: 0 PER 100 WBC
O2 DEVICE/FLOW/%: ABNORMAL
PARAINFLUENZA 1 PCR: NOT DETECTED
PARAINFLUENZA 2 PCR: NOT DETECTED
PARAINFLUENZA 3 PCR: NOT DETECTED
PARAINFLUENZA 4 PCR: NOT DETECTED
PDW BLD-RTO: 13.9 % (ref 11.8–14.4)
PLATELET # BLD: 298 K/UL (ref 138–453)
PMV BLD AUTO: 10.9 FL (ref 8.1–13.5)
POC HCO3: 26.4 MMOL/L (ref 21–28)
POC O2 SATURATION: 99 % (ref 94–98)
POC PCO2: 41.6 MM HG (ref 35–48)
POC PH: 7.41 (ref 7.35–7.45)
POC PO2: 141.9 MM HG (ref 83–108)
POSITIVE BASE EXCESS, ART: 2 (ref 0–3)
POTASSIUM SERPL-SCNC: 4.3 MMOL/L (ref 3.7–5.3)
RBC # BLD: 4.56 M/UL (ref 4.21–5.77)
RESP SYNCYTIAL VIRUS PCR: NOT DETECTED
RHINO/ENTEROVIRUS PCR: NOT DETECTED
SAMPLE SITE: ABNORMAL
SARS-COV-2, PCR: DETECTED
SEG NEUTROPHILS: 91 % (ref 34–64)
SEGMENTED NEUTROPHILS ABSOLUTE COUNT: 28.39 K/UL (ref 1.8–7.7)
SODIUM BLD-SCNC: 139 MMOL/L (ref 135–144)
SPECIMEN DESCRIPTION: ABNORMAL
TRIGL SERPL-MCNC: 49 MG/DL
WBC # BLD: 31.2 K/UL (ref 4.5–13.5)

## 2022-09-02 PROCEDURE — 5A1935Z RESPIRATORY VENTILATION, LESS THAN 24 CONSECUTIVE HOURS: ICD-10-PCS | Performed by: STUDENT IN AN ORGANIZED HEALTH CARE EDUCATION/TRAINING PROGRAM

## 2022-09-02 PROCEDURE — 82803 BLOOD GASES ANY COMBINATION: CPT

## 2022-09-02 PROCEDURE — 87641 MR-STAPH DNA AMP PROBE: CPT

## 2022-09-02 PROCEDURE — 6360000002 HC RX W HCPCS: Performed by: STUDENT IN AN ORGANIZED HEALTH CARE EDUCATION/TRAINING PROGRAM

## 2022-09-02 PROCEDURE — 2580000003 HC RX 258: Performed by: STUDENT IN AN ORGANIZED HEALTH CARE EDUCATION/TRAINING PROGRAM

## 2022-09-02 PROCEDURE — 70491 CT SOFT TISSUE NECK W/DYE: CPT

## 2022-09-02 PROCEDURE — 99291 CRITICAL CARE FIRST HOUR: CPT | Performed by: INTERNAL MEDICINE

## 2022-09-02 PROCEDURE — 80048 BASIC METABOLIC PNL TOTAL CA: CPT

## 2022-09-02 PROCEDURE — 82947 ASSAY GLUCOSE BLOOD QUANT: CPT

## 2022-09-02 PROCEDURE — 6360000002 HC RX W HCPCS

## 2022-09-02 PROCEDURE — 2700000000 HC OXYGEN THERAPY PER DAY

## 2022-09-02 PROCEDURE — 51701 INSERT BLADDER CATHETER: CPT

## 2022-09-02 PROCEDURE — 94761 N-INVAS EAR/PLS OXIMETRY MLT: CPT

## 2022-09-02 PROCEDURE — 85025 COMPLETE CBC W/AUTO DIFF WBC: CPT

## 2022-09-02 PROCEDURE — 6360000004 HC RX CONTRAST MEDICATION: Performed by: STUDENT IN AN ORGANIZED HEALTH CARE EDUCATION/TRAINING PROGRAM

## 2022-09-02 PROCEDURE — 36415 COLL VENOUS BLD VENIPUNCTURE: CPT

## 2022-09-02 PROCEDURE — 99231 SBSQ HOSP IP/OBS SF/LOW 25: CPT | Performed by: OTOLARYNGOLOGY

## 2022-09-02 PROCEDURE — 71045 X-RAY EXAM CHEST 1 VIEW: CPT

## 2022-09-02 PROCEDURE — 2500000003 HC RX 250 WO HCPCS: Performed by: STUDENT IN AN ORGANIZED HEALTH CARE EDUCATION/TRAINING PROGRAM

## 2022-09-02 PROCEDURE — 94003 VENT MGMT INPAT SUBQ DAY: CPT

## 2022-09-02 PROCEDURE — 2000000000 HC ICU R&B

## 2022-09-02 PROCEDURE — 0BH17EZ INSERTION OF ENDOTRACHEAL AIRWAY INTO TRACHEA, VIA NATURAL OR ARTIFICIAL OPENING: ICD-10-PCS | Performed by: STUDENT IN AN ORGANIZED HEALTH CARE EDUCATION/TRAINING PROGRAM

## 2022-09-02 PROCEDURE — 36600 WITHDRAWAL OF ARTERIAL BLOOD: CPT

## 2022-09-02 PROCEDURE — 0202U NFCT DS 22 TRGT SARS-COV-2: CPT

## 2022-09-02 PROCEDURE — 51798 US URINE CAPACITY MEASURE: CPT

## 2022-09-02 RX ORDER — PROPOFOL 10 MG/ML
INJECTION, EMULSION INTRAVENOUS
Status: COMPLETED
Start: 2022-09-02 | End: 2022-09-02

## 2022-09-02 RX ORDER — MIDAZOLAM HYDROCHLORIDE 1 MG/ML
INJECTION INTRAMUSCULAR; INTRAVENOUS
Status: COMPLETED
Start: 2022-09-02 | End: 2022-09-02

## 2022-09-02 RX ORDER — SODIUM CHLORIDE 9 MG/ML
INJECTION, SOLUTION INTRAVENOUS CONTINUOUS
Status: DISCONTINUED | OUTPATIENT
Start: 2022-09-02 | End: 2022-09-05

## 2022-09-02 RX ORDER — MIDAZOLAM HYDROCHLORIDE 2 MG/2ML
2 INJECTION, SOLUTION INTRAMUSCULAR; INTRAVENOUS ONCE
Status: COMPLETED | OUTPATIENT
Start: 2022-09-02 | End: 2022-09-02

## 2022-09-02 RX ORDER — PROPOFOL 10 MG/ML
5-50 INJECTION, EMULSION INTRAVENOUS CONTINUOUS
Status: DISCONTINUED | OUTPATIENT
Start: 2022-09-02 | End: 2022-09-04

## 2022-09-02 RX ADMIN — PROPOFOL 60 MCG/KG/MIN: 10 INJECTION, EMULSION INTRAVENOUS at 00:53

## 2022-09-02 RX ADMIN — MIDAZOLAM HYDROCHLORIDE 2 MG: 1 INJECTION, SOLUTION INTRAMUSCULAR; INTRAVENOUS at 03:00

## 2022-09-02 RX ADMIN — AMPICILLIN SODIUM AND SULBACTAM SODIUM 3000 MG: 2; 1 INJECTION, POWDER, FOR SOLUTION INTRAMUSCULAR; INTRAVENOUS at 23:24

## 2022-09-02 RX ADMIN — MIDAZOLAM HYDROCHLORIDE 2 MG: 1 INJECTION, SOLUTION INTRAMUSCULAR; INTRAVENOUS at 02:52

## 2022-09-02 RX ADMIN — SODIUM CHLORIDE, PRESERVATIVE FREE 20 MG: 5 INJECTION INTRAVENOUS at 22:37

## 2022-09-02 RX ADMIN — SODIUM CHLORIDE: 9 INJECTION, SOLUTION INTRAVENOUS at 11:05

## 2022-09-02 RX ADMIN — AMPICILLIN SODIUM AND SULBACTAM SODIUM 3000 MG: 2; 1 INJECTION, POWDER, FOR SOLUTION INTRAMUSCULAR; INTRAVENOUS at 18:04

## 2022-09-02 RX ADMIN — PROPOFOL 49.81 MCG/KG/MIN: 10 INJECTION, EMULSION INTRAVENOUS at 15:42

## 2022-09-02 RX ADMIN — ENOXAPARIN SODIUM 40 MG: 100 INJECTION SUBCUTANEOUS at 11:07

## 2022-09-02 RX ADMIN — PROPOFOL 1000 MG: 10 INJECTION, EMULSION INTRAVENOUS at 11:46

## 2022-09-02 RX ADMIN — IOPAMIDOL 75 ML: 755 INJECTION, SOLUTION INTRAVENOUS at 11:59

## 2022-09-02 RX ADMIN — PROPOFOL 1000 MG: 10 INJECTION, EMULSION INTRAVENOUS at 08:00

## 2022-09-02 RX ADMIN — SODIUM CHLORIDE, PRESERVATIVE FREE 20 MG: 5 INJECTION INTRAVENOUS at 11:45

## 2022-09-02 RX ADMIN — MIDAZOLAM HYDROCHLORIDE 2 MG: 2 INJECTION, SOLUTION INTRAMUSCULAR; INTRAVENOUS at 02:52

## 2022-09-02 RX ADMIN — SODIUM CHLORIDE, PRESERVATIVE FREE 10 ML: 5 INJECTION INTRAVENOUS at 22:40

## 2022-09-02 RX ADMIN — AMPICILLIN SODIUM AND SULBACTAM SODIUM 3000 MG: 2; 1 INJECTION, POWDER, FOR SOLUTION INTRAMUSCULAR; INTRAVENOUS at 11:06

## 2022-09-02 RX ADMIN — Medication 6 MG/HR: at 14:28

## 2022-09-02 RX ADMIN — PROPOFOL 50 MCG/KG/MIN: 10 INJECTION, EMULSION INTRAVENOUS at 03:50

## 2022-09-02 RX ADMIN — AMPICILLIN SODIUM AND SULBACTAM SODIUM 3000 MG: 2; 1 INJECTION, POWDER, FOR SOLUTION INTRAMUSCULAR; INTRAVENOUS at 00:51

## 2022-09-02 RX ADMIN — MIDAZOLAM HYDROCHLORIDE 2 MG: 2 INJECTION, SOLUTION INTRAMUSCULAR; INTRAVENOUS at 03:00

## 2022-09-02 RX ADMIN — PROPOFOL 50 MCG/KG/MIN: 10 INJECTION, EMULSION INTRAVENOUS at 23:24

## 2022-09-02 RX ADMIN — SODIUM CHLORIDE, PRESERVATIVE FREE 10 ML: 5 INJECTION INTRAVENOUS at 11:11

## 2022-09-02 RX ADMIN — PROPOFOL 50 MCG/KG/MIN: 10 INJECTION, EMULSION INTRAVENOUS at 19:42

## 2022-09-02 RX ADMIN — AMPICILLIN SODIUM AND SULBACTAM SODIUM 3000 MG: 2; 1 INJECTION, POWDER, FOR SOLUTION INTRAMUSCULAR; INTRAVENOUS at 05:35

## 2022-09-02 ASSESSMENT — PULMONARY FUNCTION TESTS
PIF_VALUE: 21
PIF_VALUE: 20
PIF_VALUE: 21
PIF_VALUE: 22
PIF_VALUE: 21
PIF_VALUE: 20
PIF_VALUE: 21
PIF_VALUE: 16
PIF_VALUE: 21
PIF_VALUE: 20
PIF_VALUE: 22
PIF_VALUE: 21
PIF_VALUE: 22
PIF_VALUE: 22

## 2022-09-02 ASSESSMENT — ENCOUNTER SYMPTOMS
COUGH: 0
VOICE CHANGE: 1
TROUBLE SWALLOWING: 1
SORE THROAT: 1
SHORTNESS OF BREATH: 0
VOMITING: 0
ABDOMINAL PAIN: 0
NAUSEA: 0

## 2022-09-02 ASSESSMENT — PAIN SCALES - GENERAL: PAINLEVEL_OUTOF10: 0

## 2022-09-02 NOTE — ED PROVIDER NOTES
[facility]  ED  Emergency Department Encounter  Emergency Medicine Resident     Pt Name: Melissa Urban  MRN: [id number]  [place] 2000  Date of evaluation: 9/2/22  PCP:  VENICE Desai CNP    CHIEF COMPLAINT       Chief Complaint   Patient presents with    Pharyngitis       HISTORY OFPRESENT ILLNESS  (Location/Symptom, Timing/Onset, Context/Setting, Quality, Duration, Modifying Francine Tejas.)      Melissa Urban is a 24 y.o. male  with no pertinent past medical history presents with complaints of sore throat. Patient states symptoms been ongoing for the last 2 days. He did visit the emergency department earlier this morning and was tested for strep which was negative. He was able to tolerate p.o. Decadron as well as Motrin suspension in the emergency department. He states since returning home he has had progressively worsening symptoms and now is no longer able to tolerate p.o. liquids and has been throwing up water never he drinks it he states it does get stuck in his throat. He also states he is no longer able to swallow his saliva and states he has to currently spit it out as he feels like it \"pools in his throat \". Patient also reports that his girlfriend feels his voice is changed at home. He has not taken any medication for pain since leaving the emergency department as he cannot tolerate p.o. PAST MEDICAL / SURGICAL / SOCIAL / FAMILY HISTORY      has a past medical history of ADD (attention deficit disorder). has a past surgical history that includes Dental surgery (9/23/15); Circumcision; and Mouth surgery.     Social History     Socioeconomic History    Marital status: Single     Spouse name: Not on file    Number of children: Not on file    Years of education: Not on file    Highest education level: Not on file   Occupational History    Not on file   Tobacco Use    Smoking status: Never    Smokeless tobacco: Never   Vaping Use    Vaping Use: Some days Substances: Nicotine    Devices: Disposable   Substance and Sexual Activity    Alcohol use: No     Alcohol/week: 0.0 standard drinks    Drug use: No    Sexual activity: Not Currently     Partners: Female   Other Topics Concern    Not on file   Social History Narrative    ** Merged History Encounter **          Social Determinants of Health     Financial Resource Strain: Not on file   Food Insecurity: Not on file   Transportation Needs: Not on file   Physical Activity: Not on file   Stress: Not on file   Social Connections: Not on file   Intimate Partner Violence: Not on file   Housing Stability: Not on file       Family History   Problem Relation Age of Onset    Miscarriages / [place] Mother        Allergies:  Patient has no known allergies. Home Medications:  Prior to Admission medications    Medication Sig Start Date End Date Taking? Authorizing Provider   acetaminophen (TYLENOL) 500 MG tablet Take 2 tablets by mouth 3 times daily for 7 days 9/1/22 9/8/22  Marvin Gibbs, DO   ibuprofen (IBU) 600 MG tablet Take 1 tablet by mouth every 8 hours as needed for Pain 9/1/22 9/8/22  Ky Gibbs, DO       REVIEW OF SYSTEMS    (2-9 systems for level 4, 10 or more for level 5)      Review of Systems   Constitutional:  Negative for fever. HENT:  Positive for drooling, sore throat, trouble swallowing and voice change. Negative for congestion. Respiratory:  Negative for cough and shortness of breath. Cardiovascular:  Negative for chest pain. Gastrointestinal:  Negative for abdominal pain, nausea and vomiting. Genitourinary:  Negative for dysuria. Musculoskeletal:  Negative for myalgias. Skin:  Negative for rash. Allergic/Immunologic: Negative for immunocompromised state. Neurological:  Negative for headaches. PHYSICAL EXAM   (up to 7 for level 4, 8 or more for level 5)     INITIAL VITALS:    height is 6' 1\" (1.854 m) and weight is 195 lb (88.5 kg).  His oral temperature is 97.9 °F (36.6 °C). His blood pressure is 114/57 (abnormal) and his pulse is 60. His respiration is 12 and oxygen saturation is 100%. Physical Exam  Constitutional:       Comments: Alert and oriented to person, place, time. HENT:      Right Ear: Tympanic membrane and ear canal normal.      Left Ear: Tympanic membrane and ear canal normal.      Mouth/Throat:      Comments: Oromucosa is erythematous in the posterior oropharynx, uvula is midline, able to protrude tongue out of his mouth entirely. In the posterior oropharynx I am able to visualize the epiglottis which appears to be significantly erythematous and swollen  Cardiovascular:      Rate and Rhythm: Normal rate and regular rhythm. Pulmonary:      Comments: Speaking in full sentences, no respiratory distress, lungs are clear to auscultation bilaterally  Abdominal:      Palpations: Abdomen is soft. Tenderness: There is no abdominal tenderness. Musculoskeletal:      Cervical back: Normal range of motion. Lymphadenopathy:      Cervical: No cervical adenopathy. Skin:     General: Skin is warm and dry. Capillary Refill: Capillary refill takes less than 2 seconds. Findings: No rash. DIFFERENTIAL  DIAGNOSIS     PLAN (LABS / IMAGING / EKG):  Orders Placed This Encounter   Procedures    Culture, Blood 1    Culture, Blood 1    MRSA DNA Probe, Nasal    XR ABDOMEN FOR NG/OG/NE TUBE PLACEMENT    CBC with Auto Differential    Basic Metabolic Panel    CBC with Auto Differential    Basic Metabolic Panel w/ Reflex to MG    Blood gas, arterial    Blood Gas, Arterial    Triglyceride    Diet NPO    Cardiac output monitoring    Vital signs per unit routine    Daily weights    Intake and output    Place intermittent pneumatic compression device    Strict Bedrest    Telemetry monitoring - continuous duration    Spontaneous Awakening Trial (SAT)    Turn or assist with turn approximately every 2 hours if patient is unable to turn self.  Remind patient to turn if necessary    Maintain HOB at the lowest elevation consistent with medical plan of care    Assess skin per unit guidelines    Maintain heels off of bed at all times    Pad/offload medical devices    Use lift equipment for lifting patient    Full Code    Inpatient consult to Otolaryngology    Inpatient consult to Critical Care    Initiate Oxygen Therapy Protocol    Respiratory care evaluation only    Pulse oximetry, continuous    End Tidal CO2 Continuous    Manual Mechanical Ventilation    Initiate RT Adult Mechanical Ventilation Protocol    Spontaneous Breathing Trial (SBT)    Post Extubation Oxygen Therapy    Initiate RT Adult Ventilator Aerosol Protocol    Arterial Blood Gas, POC    POCT Glucose    ADMIT TO INPATIENT    Transfer patient    Restraints non-violent or non-self-destructive       MEDICATIONS ORDERED:  Orders Placed This Encounter   Medications    DISCONTD: benzocaine-menthol (CEPACOL SORE THROAT) lozenge 1 lozenge    ketorolac (TORADOL) injection 15 mg    ampicillin-sulbactam (UNASYN) 3000 mg in 100 mL NS IVPB minibag     Order Specific Question:   Antimicrobial Indications     Answer:   Head and Neck Infection    dexamethasone (DECADRON) injection 10 mg    DISCONTD: iopamidol (ISOVUE-370) 76 % injection 75 mL    DISCONTD: lidocaine-EPINEPHrine 2 percent-1:233039 injection    sodium chloride 0.9 % irrigation    midazolam (VERSED) 1 mg/mL in D5W infusion     Order Specific Question:   Titrate Infusion? Answer:   Yes     Order Specific Question:   Initial Infusion Dose: Answer:   2 mg/hr     Order Specific Question:   Goal of Therapy is:      Answer:   RASS of -1 to 0     Order Specific Question:   Contact Provider if:     Answer:   New onset SBP less than 90 mmHg     Order Specific Question:   Contact Provider if:     Answer:   Patient is receiving the maximum dose and is not achieving the goal of therapy    sodium chloride flush 0.9 % injection 5-40 mL    sodium chloride flush 0.9 % injection 5-40 mL    0.9 % sodium chloride infusion    enoxaparin (LOVENOX) injection 40 mg     Order Specific Question:   Indication of Use     Answer:   Prophylaxis-DVT/PE    OR Linked Order Group     ondansetron (ZOFRAN-ODT) disintegrating tablet 4 mg     ondansetron (ZOFRAN) injection 4 mg    polyethylene glycol (GLYCOLAX) packet 17 g    OR Linked Order Group     acetaminophen (TYLENOL) tablet 650 mg     acetaminophen (TYLENOL) suppository 650 mg    midazolam PF (VERSED) injection 2 mg    albuterol (PROVENTIL) nebulizer solution 2.5 mg     Order Specific Question:   Initiate RT Bronchodilator Protocol     Answer:   Yes - Inpatient Protocol    propofol 1000 MG/100ML injection     Lucrecia Brower: cabinet override    propofol injection     Order Specific Question:   Titrate Infusion? Answer:   Yes     Order Specific Question:   Initial Infusion Dose: Answer:   20 mcg/kg/min     Order Specific Question:   Goal of Therapy:     Answer:   RASS of -1 to 0     Order Specific Question:   Contact Provider if:     Answer:   New onset HR less than 50 bpm     Order Specific Question:   Contact Provider if:     Answer:   New onset SBP less than 90 mmHg     Order Specific Question:   Contact Provider if:     Answer:   Patient is receiving maximum dose and is not achieving the goal of therapy     Order Specific Question:   Contact Provider if:     Answer:   Triglycerides greater than 500 mg/dL    ampicillin-sulbactam (UNASYN) 3000 mg in 100 mL NS IVPB minibag     Order Specific Question:   Antimicrobial Indications     Answer:   Head and Neck Infection    dexamethasone (DECADRON) injection 10 mg    propofol 1000 MG/100ML injection     Ruddy Villalobos: cabinet override       DDX: Epiglottitis, tonsillitis, peritonsillar abscess, retropharyngeal abscess    Initial MDM/Plan: 24 y.o. male who presents with sore throat ongoing for 2 days however acutely worsening.   He was seen in the emergency department earlier and had negative strep swab able to tolerate p.o. and was discharged home. Patient reports since discharge his symptoms have been progressively worsening and now he can no longer tolerate p.o. fluids or p.o. secretions. Girlfriend has also noted change in voice. Seen and examined, vital signs are unremarkable. Patient is well-appearing, alert and oriented person, place, time. Speaking full sentences, not ill and in no acute respiratory distress. Physical examination is remarkable for significantly inflamed and erythematous epiglottitis seen on ENT examination concerning for underlying epiglottitis given worsening symptoms and unable to tolerate p.o. secretions. Will obtain basic labs, CT soft tissue neck, consult ENT. DIAGNOSTIC RESULTS / EMERGENCY DEPARTMENT COURSE / MDM     LABS:  Labs Reviewed   CBC WITH AUTO DIFFERENTIAL - Abnormal; Notable for the following components:       Result Value    WBC 24.6 (*)     MCHC 35.4 (*)     Seg Neutrophils 90 (*)     Lymphocytes 2 (*)     Eosinophils % 0 (*)     Segs Absolute 22.14 (*)     Absolute Lymph # 0.49 (*)     Absolute Mono # 1.97 (*)     All other components within normal limits   BASIC METABOLIC PANEL - Abnormal; Notable for the following components:    Glucose 107 (*)     Creatinine 0.69 (*)     All other components within normal limits   ARTERIAL BLOOD GAS, POC - Abnormal; Notable for the following components:    POC PO2 187.4 (*)     POC O2  (*)     All other components within normal limits   POCT GLUCOSE - Abnormal; Notable for the following components:    POC Glucose 125 (*)     All other components within normal limits   CULTURE, BLOOD 1   CULTURE, BLOOD 1   MRSA DNA PROBE, NASAL   TRIGLYCERIDES   CBC WITH AUTO DIFFERENTIAL   BASIC METABOLIC PANEL W/ REFLEX TO MG FOR LOW K   BLOOD GAS, ARTERIAL         RADIOLOGY:  XR ABDOMEN FOR NG/OG/NE TUBE PLACEMENT    Result Date: 9/2/2022  EXAMINATION: ONE SUPINE XRAY VIEW(S) OF THE ABDOMEN 9/1/2022 11:14 pm COMPARISON: None. HISTORY: ORDERING SYSTEM PROVIDED HISTORY: Confirmation of course of NG/OG/NE tube and location of tip of tube TECHNOLOGIST PROVIDED HISTORY: Confirmation of course of NG/OG/NE tube and location of tip of tube Portable? ->Yes Reason for Exam: supine port   ng placement FINDINGS: The nasogastric tube is coiled within the gastric fundus with the tip directed cephalad. No focal consolidation. No free air in the upper abdomen. The enteric catheter located within the gastric fundus, with the tip just below the hemidiaphragm directed cephalad. EMERGENCY DEPARTMENT COURSE:  ED Course as of 09/02/22 0225   Thu Sep 01, 2022   164 60-year-old male with no pertinent past medical history presents with complaints of sore throat. Patient states symptoms been ongoing for the last 2 days. He did visit the emergency department earlier this morning and was tested for strep which was negative. He was able to tolerate p.o. Decadron as well as Motrin suspension in the emergency department. He states since returning home he has had progressively worsening symptoms and now is no longer able to tolerate p.o. liquids and has been throwing up water never he drinks it he states it does get stuck in his throat. He also states he is no longer able to swallow his saliva and states he has to currently spit it out as he feels like it \"pools in his throat \". Patient also reports that his girlfriend feels his voice is changed at home. He has not taken any medication for pain since leaving the emergency department as he cannot tolerate p.o. [DS]   1944 Patient to OR for intubation per ENT, will be admitted to critical care postoperatively. Spoke with critical care, they accepted admission. [DS]      ED Course User Index  [DS] Theone Marcd, DO     Examination concerning for epiglottitis and clinical picture is consistent with epiglottitis. Redosed Decadron as well as given Unasyn.   CT was ordered however after consultation with

## 2022-09-02 NOTE — ED NOTES
Pt transported up to OR with Dr. Phillip Pond and RN. Pt on transport monitor.       Lindsey Johnson RN  09/01/22 2005

## 2022-09-02 NOTE — PLAN OF CARE
Problem: Discharge Planning  Goal: Discharge to home or other facility with appropriate resources  Outcome: Progressing     Problem: Pain  Goal: Verbalizes/displays adequate comfort level or baseline comfort level  Outcome: Progressing     Problem: Safety - Medical Restraint  Goal: Remains free of injury from restraints (Restraint for Interference with Medical Device)  Description: INTERVENTIONS:  1. Determine that other, less restrictive measures have been tried or would not be effective before applying the restraint  2. Evaluate the patient's condition at the time of restraint application  3. Inform patient/family regarding the reason for restraint  4.  Q2H: Monitor safety, psychosocial status, comfort, nutrition and hydration  Outcome: Progressing  Flowsheets  Taken 9/2/2022 0200  Remains free of injury from restraints (restraint for interference with medical device): Every 2 hours: Monitor safety, psychosocial status, comfort, nutrition and hydration  Taken 9/2/2022 0000  Remains free of injury from restraints (restraint for interference with medical device): Every 2 hours: Monitor safety, psychosocial status, comfort, nutrition and hydration  Taken 9/1/2022 2147  Remains free of injury from restraints (restraint for interference with medical device): Every 2 hours: Monitor safety, psychosocial status, comfort, nutrition and hydration     Problem: Respiratory - Adult  Goal: Achieves optimal ventilation and oxygenation  9/2/2022 0237 by Elva Garcia RN  Outcome: Progressing  9/2/2022 0057 by Zabrina Dhaliwal RCP  Outcome: Progressing  Flowsheets (Taken 9/2/2022 0057)  Achieves optimal ventilation and oxygenation:   Assess for changes in respiratory status   Assess the need for suctioning and aspirate as needed   Respiratory therapy support as indicated   Assess for changes in mentation and behavior   Oxygen supplementation based on oxygen saturation or arterial blood gases     Problem: Skin/Tissue Integrity  Goal: Absence of new skin breakdown  Description: 1. Monitor for areas of redness and/or skin breakdown  2. Assess vascular access sites hourly  3. Every 4-6 hours minimum:  Change oxygen saturation probe site  4. Every 4-6 hours:  If on nasal continuous positive airway pressure, respiratory therapy assess nares and determine need for appliance change or resting period.   Outcome: Progressing
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
## 2022-09-02 NOTE — ANESTHESIA POSTPROCEDURE EVALUATION
Department of Anesthesiology  Postprocedure Note    Patient: Lena Marin  MRN: [id number]  [place]: 2000  Date of evaluation: 9/1/2022      Procedure Summary     Date: 09/01/22 Room / Location: [address]    Anesthesia Start: 1936 Anesthesia Stop: 2127    Procedure: GLIDESCOPE INTUBATION, DIRECT LARYNGOSCOPY WITH INTERVENTION Diagnosis:       Acute epiglottitis with airway obstruction      (Acute epiglottitis with airway obstruction [J05.11])    Surgeons: Vic Collins MD Responsible Provider: Keyona Pan MD    Anesthesia Type: general ASA Status: 2 - Emergent          Anesthesia Type: No value filed.     Mohamud Phase I:      Mohamud Phase II:        Anesthesia Post Evaluation    Patient location during evaluation: ICU  Patient participation: complete - patient cannot participate  Level of consciousness: sedated and ventilated  Pain score: 1  Airway patency: patent  Nausea & Vomiting: no nausea and no vomiting  Complications: no  Cardiovascular status: hemodynamically stable  Respiratory status: acceptable  Hydration status: euvolemic
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
## 2022-09-02 NOTE — PLAN OF CARE
Problem: Pain  Goal: Verbalizes/displays adequate comfort level or baseline comfort level  9/2/2022 1327 by Justin Crane RN  Outcome: Progressing  9/2/2022 0237 by Elva Garcia RN  Outcome: Progressing     Problem: Skin/Tissue Integrity  Goal: Absence of new skin breakdown  Description: 1. Monitor for areas of redness and/or skin breakdown  2. Assess vascular access sites hourly  3. Every 4-6 hours minimum:  Change oxygen saturation probe site  4. Every 4-6 hours:  If on nasal continuous positive airway pressure, respiratory therapy assess nares and determine need for appliance change or resting period.   9/2/2022 1327 by Justin Crane RN  Outcome: Progressing  9/2/2022 0237 by Elva Garcia RN  Outcome: Progressing     Problem: Safety - Adult  Goal: Free from fall injury  Outcome: Progressing  Flowsheets (Taken 9/2/2022 1320)  Free From Fall Injury: Instruct family/caregiver on patient safety

## 2022-09-02 NOTE — PROGRESS NOTES
Comprehensive Nutrition Assessment    Type and Reason for Visit:  Consult, Initial    Nutrition Recommendations/Plan:   Start nutrition as appropriate. If TF, recommend Standard w/ fiber at 40 ml/hr with propofol at current rate. Monitor weight, labs and TF tolerance     Malnutrition Assessment:  Malnutrition Status:  No malnutrition (09/02/22 5791)      Nutrition Assessment:    Patient admitted with acute epiglottitis w/ airway obstruction. He is currently intubated. Propofol is running at 26.6, in the process of weaning off. Nutrition Related Findings:    Labs/meds reviewed Wound Type: None       Current Nutrition Intake & Therapies:    Average Meal Intake: NPO  Average Supplements Intake: NPO  Diet NPO    Anthropometric Measures:  Height: 6' 1\" (185.4 cm)  Ideal Body Weight (IBW): 184 lbs (84 kg)       Current Body Weight: 196 lb (88.9 kg), 106.5 % IBW. Weight Source: Bed Scale  Current BMI (kg/m2): 25.9             BMI Categories: Overweight (BMI 25.0-29. 9)    Estimated Daily Nutrient Needs:  Energy Requirements Based On: Kcal/kg  Weight Used for Energy Requirements: Current  Energy (kcal/day): 5131-5809 kcal  Weight Used for Protein Requirements: Current  Protein (g/day): 90-95  Method Used for Fluid Requirements: Other (Comment)  Fluid (ml/day): Per physician    Nutrition Diagnosis:   Inadequate oral intake related to impaired respiratory function as evidenced by NPO or clear liquid status due to medical condition    Nutrition Interventions:   Food and/or Nutrient Delivery: Start Tube Feeding  Nutrition Education/Counseling: No recommendation at this time  Coordination of Nutrition Care: Continue to monitor while inpatient       Goals:     Goals: Meet at least 75% of estimated needs       Nutrition Monitoring and Evaluation:   Behavioral-Environmental Outcomes: None Identified  Food/Nutrient Intake Outcomes: Enteral Nutrition Intake/Tolerance  Physical Signs/Symptoms Outcomes: Biochemical Data, GI Status, Skin, Weight    Discharge Planning:     Too soon to determine     Danisha Hamlin, [address]  Contact: [phone]
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
## 2022-09-02 NOTE — CARE COORDINATION
09/02/22 1406   Service Assessment   Patient Orientation Sedated   Cognition   (unable to assess at this time)   History Provided By Child/Family   Primary Caregiver Self   Accompanied By/Relationship Pt's mother [facility] Family Members;Parent   PCP Verified by CM Yes  (Ailyn MILLARD)   Last Visit to PCP   (unsure of last visit)   Prior Functional Level Independent in ADLs/IADLs   Can patient return to prior living arrangement Unknown at present   Ability to make needs known: Unable   Financial Resources Medicaid  ([facility])   Social/Functional History   Lives With Family;Friend(s)   Type of [address] One level   [address] to enter with rails   Entrance Stairs - Number of Steps 4   Entrance Stairs - Rails Left   ADL Assistance Independent   Homemaking Assistance Independent   Ambulation Assistance Independent   Transfer Assistance Independent   Active  Yes   Mode of Transportation   (Has 's license, does not have car. Friend/ family transport)   Occupation Unemployed   Discharge Planning   Type of [place] Family Members   Current Services Prior To Admission None   Potential Assistance Purchasing Medications No   History of falls? 0   Services At/After Discharge   [employer] Information Provided? No   Condition of Participation: Discharge Planning   The Plan for Transition of Care is related to the following treatment goals: Intubated/ Sedated FiO2 30%, PEEP of 5, Goal is home, lives w/ brother. Monitor for needs. The Patient and/or Patient Representative was provided with a Choice of Provider? Patient Representative   Name of the Patient Representative who was provided with the Choice of Provider and agrees with the Discharge Plan?  pt's mother Carissa Villa   The Patient and/Or Patient Representative agree with the Discharge Plan?  Yes   Freedom of Choice list was provided with basic dialogue that supports the patient's individualized plan of care/goals, treatment preferences, and shares the quality data associated with the providers?    (will provide choices if needed.)     Pt's mother verified demographics to the best of her ability. She is Next of Kin, not legal guardian. Goal is home, pt lives with brother.    Mother is primary contact

## 2022-09-02 NOTE — PLAN OF CARE
Problem: Respiratory - Adult  Goal: Achieves optimal ventilation and oxygenation  Outcome: Progressing  Flowsheets (Taken 9/2/2022 0057)  Achieves optimal ventilation and oxygenation:   Assess for changes in respiratory status   Assess the need for suctioning and aspirate as needed   Respiratory therapy support as indicated   Assess for changes in mentation and behavior   Oxygen supplementation based on oxygen saturation or arterial blood gases

## 2022-09-02 NOTE — PROGRESS NOTES
Patient came up from OR with Propofol running at 100 mcg. Crit Care resident told RN to slowly wean Propofol down while going up on Versed.

## 2022-09-02 NOTE — PROGRESS NOTES
[facility]  [facility]ESS [facility]    Shift date: 09/01/22  Shift day: Thursday   Shift # 2    Room # STVZ OR POOL RM/NONE   Name: George Reynolds                Jain:    Place of Caodaism:     Referral:  Nurse requested  to escort family to surgical waiting room. Admit Date & Time: 9/1/2022  5:30 PM    Assessment:  George Reynolds is a 24 y.o. male     Intervention:  Writer introduced self and title as . Family appeared to be receptive to  presence and engaged in conversation. Writer offered space for family  to express feelings, needs, and concerns and provided a ministry presence.  contacted surgical  area to inform family is present in the waiting room. Outcome:  Family appeared receptive to  presence. Plan:  Chaplains will remain available to offer spiritual and emotional support as needed.       Electronically signed by Celestino Connell on 9/1/2022 at 9:30 PM.  [facility]  [phone]

## 2022-09-02 NOTE — OP NOTE
patient was breathed down by the anesthesia service. With their glidescope, the airway was able to be easily visualized and a ETT passed transorally past the large edematous epiglottis and arytenoids into the endolarynx and glottis on their first attempt. The ETT position within the airway was verified by the anesthesia service and bilateral lung sounds were heard equally. However, during their intubation using the glidescope, the epiglottis looked very pale and almost yellow along its lingual surface. A rigid Dedo laryngoscope was inserted through the mouth after protecting the maxillary dentition. The patient was properly positioned for the procedure on a shoulder roll. The Dedo laryngoscope was suspended from the suspension bar. The epiglottis was probe with blunt instrumentation as the R lateral surface appeared to almost have purulence noted along the lateral surface. I bluntly probed this but no purulence was able to be encountered. The lingual surface of the epiglottis appeared yellow as well. About 1 cc of 2% lidocaine 1:100,000 epinephrine was injected along the lingual surface of the L paramedian epiglottis and allowed to take effect with a 25 gauge spinal needle. After allowing this to take effect for  minutes, I used a 22 gauge spinal needle to probe this area and the submucosal lingual surface of the epiglottis looking for an epiglottic abscess. None was able to be found with aspiration from the 22 gauge needle. I proceeded to stop the procedure at this time. Photodocumentation was achieved using the digital image capture system of the epiglottis. The finding were as described above. The patient was transported to the ICU after the intubation and DL. I was present for and directly performed or supervised the entire procedure.       Ward Randhawa MD   Pediatric Otolaryngology-Head and [address] [place] [address] [facility]

## 2022-09-02 NOTE — PROGRESS NOTES
SPIRITUAL CARE DEPARTMENT - [address] 83  PROGRESS NOTE    Shift date: 9.1.2022  Shift day: Thursday   Shift # 2    Room # 3020/3020-01   Name: Savita Antoine                Holiness: unknown   Place of [employer]: unknown    Referral: Routine Visit    Admit Date & Time: 9/1/2022  5:30 PM    Assessment:  Savita Antoine is a 24 y.o. male in the hospital and having surgery. Friend of the patient requires an escort to surgery. Friend is calm and coping. Intervention:  Writer introduced self and title as  Writer offered space for the friend  to express feelings, needs, and concerns and provided a ministry presence. Determined support to be available. Outcome:  Friend calm and coping     Plan:  Chaplains will remain available to offer spiritual and emotional support as needed. Electronically signed by Catarina Sol on 9/2/2022 at [address] [facility]  [phone]       09/01/22 2230   Encounter Summary   Service Provided For: Friend   Referral/Consult From: Nurse   Support System Family members   Last Encounter  09/01/22   Complexity of Encounter Moderate   Begin Time 2230   End Time  2245   Total Time Calculated 15 min   Encounter    Type Post-Procedural   Assessment/Intervention/Outcome   Assessment Calm;Coping   Intervention Discussed illness injury and its impact; Explored/Affirmed feelings, thoughts, concerns   Outcome Engaged in conversation;Expressed feelings, needs, and concerns     Electronically signed by Keri Ibarra on 9/2/2022 at 1:55 AM

## 2022-09-03 PROBLEM — R06.89 ACUTE RESPIRATORY INSUFFICIENCY: Status: ACTIVE | Noted: 2022-09-03

## 2022-09-03 PROBLEM — J05.10 ACUTE EPIGLOTTITIS WITHOUT AIRWAY OBSTRUCTION: Status: ACTIVE | Noted: 2022-09-03

## 2022-09-03 LAB
ABSOLUTE EOS #: 0 K/UL (ref 0–0.4)
ABSOLUTE IMMATURE GRANULOCYTE: 0 K/UL (ref 0–0.3)
ABSOLUTE LYMPH #: 1.18 K/UL (ref 1–4.8)
ABSOLUTE MONO #: 1.91 K/UL (ref 0.1–1.4)
ANION GAP SERPL CALCULATED.3IONS-SCNC: 12 MMOL/L (ref 9–17)
BASOPHILS # BLD: 0 % (ref 0–2)
BASOPHILS ABSOLUTE: 0 K/UL (ref 0–0.2)
BUN BLDV-MCNC: 15 MG/DL (ref 6–20)
CALCIUM SERPL-MCNC: 8.3 MG/DL (ref 8.6–10.4)
CHLORIDE BLD-SCNC: 106 MMOL/L (ref 98–107)
CO2: 24 MMOL/L (ref 20–31)
CREAT SERPL-MCNC: 1.04 MG/DL (ref 0.7–1.2)
EOSINOPHILS RELATIVE PERCENT: 0 % (ref 1–4)
FIO2: 30
GFR AFRICAN AMERICAN: >60 ML/MIN
GFR NON-AFRICAN AMERICAN: >60 ML/MIN
GFR SERPL CREATININE-BSD FRML MDRD: ABNORMAL ML/MIN/{1.73_M2}
GLUCOSE BLD-MCNC: 73 MG/DL (ref 75–110)
GLUCOSE BLD-MCNC: 81 MG/DL (ref 70–99)
GLUCOSE BLD-MCNC: 89 MG/DL (ref 74–100)
HCT VFR BLD CALC: 42.5 % (ref 40.7–50.3)
HEMOGLOBIN: 13.2 G/DL (ref 13–17)
IMMATURE GRANULOCYTES: 0 %
LYMPHOCYTES # BLD: 8 % (ref 25–45)
MCH RBC QN AUTO: 29 PG (ref 25.2–33.5)
MCHC RBC AUTO-ENTMCNC: 31.1 G/DL (ref 28.4–34.8)
MCV RBC AUTO: 93.4 FL (ref 82.6–102.9)
MODE: ABNORMAL
MONOCYTES # BLD: 13 % (ref 2–8)
MORPHOLOGY: ABNORMAL
MRSA, DNA, NASAL: NEGATIVE
NRBC AUTOMATED: 0 PER 100 WBC
O2 DEVICE/FLOW/%: ABNORMAL
PDW BLD-RTO: 14.8 % (ref 11.8–14.4)
PLATELET # BLD: 277 K/UL (ref 138–453)
PMV BLD AUTO: 11.3 FL (ref 8.1–13.5)
POC HCO3: 25.7 MMOL/L (ref 21–28)
POC O2 SATURATION: 99 % (ref 94–98)
POC PCO2: 38.4 MM HG (ref 35–48)
POC PH: 7.43 (ref 7.35–7.45)
POC PO2: 123.4 MM HG (ref 83–108)
POSITIVE BASE EXCESS, ART: 2 (ref 0–3)
POTASSIUM SERPL-SCNC: 3.9 MMOL/L (ref 3.7–5.3)
RBC # BLD: 4.55 M/UL (ref 4.21–5.77)
SAMPLE SITE: ABNORMAL
SEG NEUTROPHILS: 79 % (ref 34–64)
SEGMENTED NEUTROPHILS ABSOLUTE COUNT: 11.61 K/UL (ref 1.8–7.7)
SODIUM BLD-SCNC: 142 MMOL/L (ref 135–144)
SPECIMEN DESCRIPTION: NORMAL
WBC # BLD: 14.7 K/UL (ref 4.5–13.5)

## 2022-09-03 PROCEDURE — 2580000003 HC RX 258: Performed by: STUDENT IN AN ORGANIZED HEALTH CARE EDUCATION/TRAINING PROGRAM

## 2022-09-03 PROCEDURE — 99291 CRITICAL CARE FIRST HOUR: CPT | Performed by: INTERNAL MEDICINE

## 2022-09-03 PROCEDURE — 85025 COMPLETE CBC W/AUTO DIFF WBC: CPT

## 2022-09-03 PROCEDURE — 2000000000 HC ICU R&B

## 2022-09-03 PROCEDURE — 82803 BLOOD GASES ANY COMBINATION: CPT

## 2022-09-03 PROCEDURE — 94003 VENT MGMT INPAT SUBQ DAY: CPT

## 2022-09-03 PROCEDURE — 99232 SBSQ HOSP IP/OBS MODERATE 35: CPT | Performed by: OTOLARYNGOLOGY

## 2022-09-03 PROCEDURE — 94761 N-INVAS EAR/PLS OXIMETRY MLT: CPT

## 2022-09-03 PROCEDURE — 82947 ASSAY GLUCOSE BLOOD QUANT: CPT

## 2022-09-03 PROCEDURE — 36415 COLL VENOUS BLD VENIPUNCTURE: CPT

## 2022-09-03 PROCEDURE — 51701 INSERT BLADDER CATHETER: CPT

## 2022-09-03 PROCEDURE — 51798 US URINE CAPACITY MEASURE: CPT

## 2022-09-03 PROCEDURE — 2500000003 HC RX 250 WO HCPCS: Performed by: STUDENT IN AN ORGANIZED HEALTH CARE EDUCATION/TRAINING PROGRAM

## 2022-09-03 PROCEDURE — 80048 BASIC METABOLIC PNL TOTAL CA: CPT

## 2022-09-03 PROCEDURE — 6360000002 HC RX W HCPCS: Performed by: STUDENT IN AN ORGANIZED HEALTH CARE EDUCATION/TRAINING PROGRAM

## 2022-09-03 PROCEDURE — 36600 WITHDRAWAL OF ARTERIAL BLOOD: CPT

## 2022-09-03 RX ADMIN — SODIUM CHLORIDE: 9 INJECTION, SOLUTION INTRAVENOUS at 10:34

## 2022-09-03 RX ADMIN — PROPOFOL 45 MCG/KG/MIN: 10 INJECTION, EMULSION INTRAVENOUS at 22:46

## 2022-09-03 RX ADMIN — AMPICILLIN SODIUM AND SULBACTAM SODIUM 3000 MG: 2; 1 INJECTION, POWDER, FOR SOLUTION INTRAMUSCULAR; INTRAVENOUS at 12:57

## 2022-09-03 RX ADMIN — AMPICILLIN SODIUM AND SULBACTAM SODIUM 3000 MG: 2; 1 INJECTION, POWDER, FOR SOLUTION INTRAMUSCULAR; INTRAVENOUS at 05:43

## 2022-09-03 RX ADMIN — PROPOFOL 50 MCG/KG/MIN: 10 INJECTION, EMULSION INTRAVENOUS at 18:41

## 2022-09-03 RX ADMIN — ENOXAPARIN SODIUM 40 MG: 100 INJECTION SUBCUTANEOUS at 10:25

## 2022-09-03 RX ADMIN — DEXAMETHASONE SODIUM PHOSPHATE 10 MG: 10 INJECTION INTRAMUSCULAR; INTRAVENOUS at 18:33

## 2022-09-03 RX ADMIN — Medication 6 MG/HR: at 07:59

## 2022-09-03 RX ADMIN — PROPOFOL 50 MCG/KG/MIN: 10 INJECTION, EMULSION INTRAVENOUS at 15:28

## 2022-09-03 RX ADMIN — PROPOFOL 50 MCG/KG/MIN: 10 INJECTION, EMULSION INTRAVENOUS at 11:05

## 2022-09-03 RX ADMIN — SODIUM CHLORIDE, PRESERVATIVE FREE 20 MG: 5 INJECTION INTRAVENOUS at 21:05

## 2022-09-03 RX ADMIN — PROPOFOL 50 MCG/KG/MIN: 10 INJECTION, EMULSION INTRAVENOUS at 07:02

## 2022-09-03 RX ADMIN — SODIUM CHLORIDE, PRESERVATIVE FREE 10 ML: 5 INJECTION INTRAVENOUS at 10:25

## 2022-09-03 RX ADMIN — AMPICILLIN SODIUM AND SULBACTAM SODIUM 3000 MG: 2; 1 INJECTION, POWDER, FOR SOLUTION INTRAMUSCULAR; INTRAVENOUS at 18:44

## 2022-09-03 RX ADMIN — SODIUM CHLORIDE: 9 INJECTION, SOLUTION INTRAVENOUS at 00:26

## 2022-09-03 RX ADMIN — SODIUM CHLORIDE, PRESERVATIVE FREE 10 ML: 5 INJECTION INTRAVENOUS at 21:06

## 2022-09-03 RX ADMIN — SODIUM CHLORIDE, PRESERVATIVE FREE 20 MG: 5 INJECTION INTRAVENOUS at 10:25

## 2022-09-03 RX ADMIN — DEXAMETHASONE SODIUM PHOSPHATE 10 MG: 10 INJECTION INTRAMUSCULAR; INTRAVENOUS at 10:28

## 2022-09-03 RX ADMIN — DEXAMETHASONE SODIUM PHOSPHATE 10 MG: 10 INJECTION INTRAMUSCULAR; INTRAVENOUS at 05:00

## 2022-09-03 RX ADMIN — PROPOFOL 50 MCG/KG/MIN: 10 INJECTION, EMULSION INTRAVENOUS at 04:00

## 2022-09-03 ASSESSMENT — PULMONARY FUNCTION TESTS
PIF_VALUE: 5
PIF_VALUE: 19
PIF_VALUE: 22
PIF_VALUE: 24
PIF_VALUE: 24

## 2022-09-03 NOTE — PLAN OF CARE
Problem: Pain  Goal: Verbalizes/displays adequate comfort level or baseline comfort level  Outcome: Progressing     Problem: Safety - Medical Restraint  Goal: Remains free of injury from restraints (Restraint for Interference with Medical Device)  Description: INTERVENTIONS:  1. Determine that other, less restrictive measures have been tried or would not be effective before applying the restraint  2. Evaluate the patient's condition at the time of restraint application  3. Inform patient/family regarding the reason for restraint  4. Q2H: Monitor safety, psychosocial status, comfort, nutrition and hydration  Outcome: Progressing  Flowsheets  Taken 9/3/2022 1600  Remains free of injury from restraints (restraint for interference with medical device): Every 2 hours: Monitor safety, psychosocial status, comfort, nutrition and hydration  Taken 9/3/2022 1400  Remains free of injury from restraints (restraint for interference with medical device): Every 2 hours: Monitor safety, psychosocial status, comfort, nutrition and hydration  Taken 9/3/2022 1200  Remains free of injury from restraints (restraint for interference with medical device): Every 2 hours: Monitor safety, psychosocial status, comfort, nutrition and hydration  Taken 9/3/2022 1000  Remains free of injury from restraints (restraint for interference with medical device): Every 2 hours: Monitor safety, psychosocial status, comfort, nutrition and hydration  Taken 9/3/2022 0800  Remains free of injury from restraints (restraint for interference with medical device): Every 2 hours: Monitor safety, psychosocial status, comfort, nutrition and hydration     Problem: Skin/Tissue Integrity  Goal: Absence of new skin breakdown  Description: 1. Monitor for areas of redness and/or skin breakdown  2. Assess vascular access sites hourly  3. Every 4-6 hours minimum:  Change oxygen saturation probe site  4.   Every 4-6 hours:  If on nasal continuous positive airway pressure, respiratory therapy assess nares and determine need for appliance change or resting period.   Outcome: Progressing

## 2022-09-03 NOTE — PROGRESS NOTES
ENT/OTOLARYNGOLOGY PROGRESS NOTE     REASON FOR CARE: epiglottitis     HISTORY OF PRESENT ILLNESS:   Olga Sun is a 24 y.o. who is being seen for follow-up of epiglottitis, intubated approximately 36 hours ago.         Medications:   Current Facility-Administered Medications   Medication Dose Route Frequency Provider Last Rate Last Admin    0.9 % sodium chloride infusion   IntraVENous Continuous Adolfo Prader, MD 50 mL/hr at 09/03/22 1034 New Bag at 09/03/22 1034    famotidine (PEPCID) 20 mg in sodium chloride (PF) 10 mL injection  20 mg IntraVENous BID Adolfo Prader, MD   20 mg at 09/03/22 1025    propofol injection  5-50 mcg/kg/min IntraVENous Continuous Marla Mcpherson DO 26.7 mL/hr at 09/03/22 1105 50 mcg/kg/min at 09/03/22 1105    midazolam (VERSED) 1 mg/mL in D5W infusion  1-10 mg/hr IntraVENous Continuous Ash Huynh MD 6 mL/hr at 09/03/22 0759 6 mg/hr at 09/03/22 0759    sodium chloride flush 0.9 % injection 5-40 mL  5-40 mL IntraVENous 2 times per day Sujatha Bailey MD   10 mL at 09/03/22 1025    sodium chloride flush 0.9 % injection 5-40 mL  5-40 mL IntraVENous PRN Ash Huynh MD        0.9 % sodium chloride infusion   IntraVENous PRN Ash Huynh MD 10 mL/hr at 09/02/22 0627 Rate Verify at 09/02/22 0627    enoxaparin (LOVENOX) injection 40 mg  40 mg SubCUTAneous Daily Ash Huynh MD   40 mg at 09/03/22 1025    ondansetron (ZOFRAN-ODT) disintegrating tablet 4 mg  4 mg Oral Q8H PRN Ash Huynh MD        Or    ondansetron (ZOFRAN) injection 4 mg  4 mg IntraVENous Q6H PRN Ash Huynh MD        polyethylene glycol (GLYCOLAX) packet 17 g  17 g Oral Daily PRN Ash Huynh MD        acetaminophen (TYLENOL) tablet 650 mg  650 mg Oral Q6H PRN Ash Huynh MD        Or    acetaminophen (TYLENOL) suppository 650 mg  650 mg Rectal Q6H PRN Ash Huynh MD        albuterol (PROVENTIL) nebulizer solution 2.5 mg  2.5 mg Nebulization Q4H PRN Eyad Izaguirre MD        ampicillin-sulbactam (UNASYN) 3000 mg in 100 mL NS IVPB minibag  3,000 mg IntraVENous Q6H Ash Hwang MD   Stopped at 09/03/22 3778    dexamethasone (DECADRON) injection 10 mg  10 mg IntraVENous Q8H Ash Hwang MD   10 mg at 09/03/22 1028       Examination:     GENERAL: Intubated, sedated  OC/OP exam limited due to patient biting down  Neck is soft and flat. RELEVANT LABS/STUDIES:     WBC  9/3 = 14.7  9/2 = 31.2  9/1 = 24.6     IMPRESSION AND RECOMMENDATIONS:   20yo with epiglottitis in setting of covid+ requiring urgent/emergent intubation on evening of 9/1. White count is downrending. CT Neck reviewed and no abscess present. Plan:   - Bedside evaluation/scope tomorrow am 9/4 to assess epiglottis. This can be limited due to secretions and crowding, but will attempt. 1. If epiglottis visible and not edematous, then can extubate at the bedside at any point thereafter. 2. Should edema persist, will re-evaluate at 24h intervals. 3. If evaluation too limited, can perform bedside direct laryngoscopy with a laryngoscope or video-laryngoscope, but this would require increased sedation/paralysis. This could also be done in the OR.     Otherwise continue with airway support, abx, and ICU care.    --------------------------------------------------  Zion Rey MD  Pediatric Otolaryngology-Head and Neck [address] [facility] Otolaryngology group    Office  ph# [phone]    Also available in [facility]

## 2022-09-04 LAB
ABSOLUTE EOS #: <0.03 K/UL (ref 0–0.44)
ABSOLUTE IMMATURE GRANULOCYTE: 0.05 K/UL (ref 0–0.3)
ABSOLUTE LYMPH #: 1.03 K/UL (ref 1.1–3.7)
ABSOLUTE MONO #: 0.99 K/UL (ref 0.1–1.4)
ALLEN TEST: POSITIVE
ANION GAP SERPL CALCULATED.3IONS-SCNC: 9 MMOL/L (ref 9–17)
BASOPHILS # BLD: 0 % (ref 0–2)
BASOPHILS ABSOLUTE: <0.03 K/UL (ref 0–0.2)
BUN BLDV-MCNC: 14 MG/DL (ref 6–20)
CALCIUM SERPL-MCNC: 8.6 MG/DL (ref 8.6–10.4)
CHLORIDE BLD-SCNC: 104 MMOL/L (ref 98–107)
CO2: 25 MMOL/L (ref 20–31)
CREAT SERPL-MCNC: 0.72 MG/DL (ref 0.7–1.2)
EOSINOPHILS RELATIVE PERCENT: 0 % (ref 1–4)
FIO2: 30
GFR AFRICAN AMERICAN: >60 ML/MIN
GFR NON-AFRICAN AMERICAN: >60 ML/MIN
GFR SERPL CREATININE-BSD FRML MDRD: ABNORMAL ML/MIN/{1.73_M2}
GLUCOSE BLD-MCNC: 116 MG/DL (ref 75–110)
GLUCOSE BLD-MCNC: 125 MG/DL (ref 70–99)
GLUCOSE BLD-MCNC: 130 MG/DL (ref 74–100)
HCT VFR BLD CALC: 38.4 % (ref 40.7–50.3)
HEMOGLOBIN: 12.9 G/DL (ref 13–17)
IMMATURE GRANULOCYTES: 1 %
LYMPHOCYTES # BLD: 10 % (ref 25–45)
MCH RBC QN AUTO: 29.3 PG (ref 25.2–33.5)
MCHC RBC AUTO-ENTMCNC: 33.6 G/DL (ref 28.4–34.8)
MCV RBC AUTO: 87.1 FL (ref 82.6–102.9)
MODE: NORMAL
MONOCYTES # BLD: 9 % (ref 2–8)
NRBC AUTOMATED: 0 PER 100 WBC
O2 DEVICE/FLOW/%: NORMAL
PDW BLD-RTO: 14.5 % (ref 11.8–14.4)
PLATELET # BLD: 294 K/UL (ref 138–453)
PMV BLD AUTO: 11.5 FL (ref 8.1–13.5)
POC HCO3: 26.7 MMOL/L (ref 21–28)
POC LACTIC ACID: 1.08 MMOL/L (ref 0.56–1.39)
POC O2 SATURATION: 97 % (ref 94–98)
POC PCO2: 42.5 MM HG (ref 35–48)
POC PH: 7.41 (ref 7.35–7.45)
POC PO2: 86.6 MM HG (ref 83–108)
POSITIVE BASE EXCESS, ART: 2 (ref 0–3)
POTASSIUM SERPL-SCNC: 4.1 MMOL/L (ref 3.7–5.3)
RBC # BLD: 4.41 M/UL (ref 4.21–5.77)
RBC # BLD: ABNORMAL 10*6/UL
SAMPLE SITE: NORMAL
SEG NEUTROPHILS: 81 % (ref 34–64)
SEGMENTED NEUTROPHILS ABSOLUTE COUNT: 8.71 K/UL (ref 1.5–8.1)
SODIUM BLD-SCNC: 138 MMOL/L (ref 135–144)
WBC # BLD: 10.8 K/UL (ref 4.5–13.5)

## 2022-09-04 PROCEDURE — 99232 SBSQ HOSP IP/OBS MODERATE 35: CPT | Performed by: OTOLARYNGOLOGY

## 2022-09-04 PROCEDURE — 2580000003 HC RX 258: Performed by: STUDENT IN AN ORGANIZED HEALTH CARE EDUCATION/TRAINING PROGRAM

## 2022-09-04 PROCEDURE — 6360000002 HC RX W HCPCS: Performed by: STUDENT IN AN ORGANIZED HEALTH CARE EDUCATION/TRAINING PROGRAM

## 2022-09-04 PROCEDURE — 85025 COMPLETE CBC W/AUTO DIFF WBC: CPT

## 2022-09-04 PROCEDURE — 99291 CRITICAL CARE FIRST HOUR: CPT | Performed by: INTERNAL MEDICINE

## 2022-09-04 PROCEDURE — 2700000000 HC OXYGEN THERAPY PER DAY

## 2022-09-04 PROCEDURE — 94761 N-INVAS EAR/PLS OXIMETRY MLT: CPT

## 2022-09-04 PROCEDURE — 36600 WITHDRAWAL OF ARTERIAL BLOOD: CPT

## 2022-09-04 PROCEDURE — 2500000003 HC RX 250 WO HCPCS: Performed by: STUDENT IN AN ORGANIZED HEALTH CARE EDUCATION/TRAINING PROGRAM

## 2022-09-04 PROCEDURE — 36415 COLL VENOUS BLD VENIPUNCTURE: CPT

## 2022-09-04 PROCEDURE — 2000000000 HC ICU R&B

## 2022-09-04 PROCEDURE — 83605 ASSAY OF LACTIC ACID: CPT

## 2022-09-04 PROCEDURE — 94003 VENT MGMT INPAT SUBQ DAY: CPT

## 2022-09-04 PROCEDURE — 82803 BLOOD GASES ANY COMBINATION: CPT

## 2022-09-04 PROCEDURE — 80048 BASIC METABOLIC PNL TOTAL CA: CPT

## 2022-09-04 PROCEDURE — 82947 ASSAY GLUCOSE BLOOD QUANT: CPT

## 2022-09-04 PROCEDURE — A4216 STERILE WATER/SALINE, 10 ML: HCPCS | Performed by: STUDENT IN AN ORGANIZED HEALTH CARE EDUCATION/TRAINING PROGRAM

## 2022-09-04 PROCEDURE — 31575 DIAGNOSTIC LARYNGOSCOPY: CPT | Performed by: OTOLARYNGOLOGY

## 2022-09-04 RX ADMIN — DEXAMETHASONE SODIUM PHOSPHATE 10 MG: 10 INJECTION INTRAMUSCULAR; INTRAVENOUS at 20:26

## 2022-09-04 RX ADMIN — SODIUM CHLORIDE, PRESERVATIVE FREE 10 ML: 5 INJECTION INTRAVENOUS at 20:27

## 2022-09-04 RX ADMIN — SODIUM CHLORIDE, PRESERVATIVE FREE 20 MG: 5 INJECTION INTRAVENOUS at 08:46

## 2022-09-04 RX ADMIN — AMPICILLIN SODIUM AND SULBACTAM SODIUM 3000 MG: 2; 1 INJECTION, POWDER, FOR SOLUTION INTRAMUSCULAR; INTRAVENOUS at 00:00

## 2022-09-04 RX ADMIN — DEXAMETHASONE SODIUM PHOSPHATE 10 MG: 10 INJECTION INTRAMUSCULAR; INTRAVENOUS at 03:45

## 2022-09-04 RX ADMIN — PROPOFOL 50 MCG/KG/MIN: 10 INJECTION, EMULSION INTRAVENOUS at 09:10

## 2022-09-04 RX ADMIN — ENOXAPARIN SODIUM 40 MG: 100 INJECTION SUBCUTANEOUS at 08:46

## 2022-09-04 RX ADMIN — SODIUM CHLORIDE, PRESERVATIVE FREE 10 ML: 5 INJECTION INTRAVENOUS at 08:48

## 2022-09-04 RX ADMIN — PROPOFOL 50 MCG/KG/MIN: 10 INJECTION, EMULSION INTRAVENOUS at 06:38

## 2022-09-04 RX ADMIN — AMPICILLIN SODIUM AND SULBACTAM SODIUM 3000 MG: 2; 1 INJECTION, POWDER, FOR SOLUTION INTRAMUSCULAR; INTRAVENOUS at 10:50

## 2022-09-04 RX ADMIN — Medication 5 MG/HR: at 01:57

## 2022-09-04 RX ADMIN — SODIUM CHLORIDE, PRESERVATIVE FREE 20 MG: 5 INJECTION INTRAVENOUS at 21:24

## 2022-09-04 RX ADMIN — DEXAMETHASONE SODIUM PHOSPHATE 10 MG: 10 INJECTION INTRAMUSCULAR; INTRAVENOUS at 09:12

## 2022-09-04 RX ADMIN — AMPICILLIN SODIUM AND SULBACTAM SODIUM 3000 MG: 2; 1 INJECTION, POWDER, FOR SOLUTION INTRAMUSCULAR; INTRAVENOUS at 23:31

## 2022-09-04 RX ADMIN — PROPOFOL 50 MCG/KG/MIN: 10 INJECTION, EMULSION INTRAVENOUS at 02:48

## 2022-09-04 RX ADMIN — AMPICILLIN SODIUM AND SULBACTAM SODIUM 3000 MG: 2; 1 INJECTION, POWDER, FOR SOLUTION INTRAMUSCULAR; INTRAVENOUS at 05:02

## 2022-09-04 ASSESSMENT — PULMONARY FUNCTION TESTS
PIF_VALUE: 25
PIF_VALUE: 23
PIF_VALUE: 11
PIF_VALUE: 1

## 2022-09-04 NOTE — PROGRESS NOTES
ENT/OTOLARYNGOLOGY PROGRESS NOTE     REASON FOR CARE: epiglotittis     HISTORY OF PRESENT ILLNESS:   Kayla Ugarte is a 24 y.o. with epiglottitis urgently intubated the evening of 9/2. No acute events overnight. +Cuff leak with cuff deflated. PAST MEDICAL HISTORY:   He  has a past medical history of ADD (attention deficit disorder). PAST SURGICAL HISTORY:   He  has a past surgical history that includes Dental surgery (9/23/15); Circumcision; Mouth surgery; and laryngoscopy (N/A, 9/1/2022). FAMILY HISTORY:   No family history related to presenting problem. SOCIAL HISTORY:   No social history related to presenting problem. MEDICATIONS:   As reviewed in the Medication Activity. ALLERGIES:   No Known Allergies          PHYSICAL EXAM:      Vitals:    09/04/22 0400 09/04/22 0500 09/04/22 0600 09/04/22 0756   BP: (!) 123/56 (!) 142/84 118/62    Pulse: (!) 47 (!) 40 (!) 43 (!) 40   Resp: 12 12 12 12   Temp: 97.9 °F (36.6 °C)  98.2 °F (36.8 °C)    TempSrc: Oral  Oral    SpO2: 100% 99% 100% 100%   Weight:   197 lb 15.6 oz (89.8 kg)    Height:            GENERAL: Intubated, sedated    PROCEDURE:  Flexible fiberoptic nasopharyngolaryngoscopy  DATE AND TIME OF PROCEDURE: 9/4/2022 0930  SURGEON:  Misty Scheuermann, MD   ASSISTANT SURGEON: None  SCOPE USED: Disposable Storz  INDICATION(S): visualization of larynx  TIME OUT: A time out was conducted immediately before starting the procedure that confirmed a final verification of the correct patient, correct procedure, correct patient position, correct site and availability of special equipment. SITE: Nose, Nasopharynx, Oropharynx, Hypopharynx, Larynx  PROCEDURAL ANALGESIA/ANESTHESIA: none  PROCEDURE: Scope advanced through the right nostril(s) to sequentially examine the nasopharynx, palate, oropharynx, base of tongue, epiglottis, and larynx.   COMPLICATIONS: none  ESTIMATED BLOOD LOSS: none  PATHOLOGIC SPECIMEN: none    FINDINGS:   Nasal cavity:     Right: Patent: Yes   Masses:  No   Posterior turbinate hypertrophy:  No   Septal deviation: No     Nasopharynx:      Mucosa:     normal Eustachian Tube:  clear     Inflammation: none     Adenoids: 50%     Masses: No       Larynx and Hypopharynx:     Hypopharynx: SERINA     Epiglottis: visualized the right side (SERINA left side to tubes/secretions). There is trace edema with mild mottling; does not appear obstructive. Good visualization of the laryngeal side and gap present from ETT     Arytenoids: limited assessment of primarily right side and appears normal     Vocal Cords: SERINA         RELEVANT LABS/STUDIES:        WBC 10.8 from 14.7 from 31.2       IMPRESSION AND RECOMMENDATIONS:     20yo with epiglottitis who has been intubated for ~60h. Remains on abx and steroids. +Cuff leak. Improved edema of epiglottis on bedside scope today.       Plan:  OK for extubation at bedside when read from sedation/pulm standpoint  Continue pamela-extubation steroids, and extend ~16-24h from time of extubation  Cont abx        --------------------------------------------------  Britt Bhatti MD  Pediatric Otolaryngology-Head and Neck [address] [facility] Otolaryngology group    Office  ph# [phone]    Also available in [address] [place] [address]

## 2022-09-04 NOTE — PLAN OF CARE
Problem: Respiratory - Adult  Goal: Achieves optimal ventilation and oxygenation  9/4/2022 1002 by Cheyenne Cole RCP  Outcome: Progressing

## 2022-09-04 NOTE — PLAN OF CARE
Problem: Respiratory - Adult  Goal: Achieves optimal ventilation and oxygenation  9/3/2022 2157 by Atilio Fernandez RCP  Outcome: Progressing
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
## 2022-09-05 VITALS
OXYGEN SATURATION: 96 % | RESPIRATION RATE: 12 BRPM | BODY MASS INDEX: 26.24 KG/M2 | HEART RATE: 53 BPM | TEMPERATURE: 98.2 F | HEIGHT: 73 IN | DIASTOLIC BLOOD PRESSURE: 80 MMHG | SYSTOLIC BLOOD PRESSURE: 130 MMHG | WEIGHT: 197.97 LBS

## 2022-09-05 LAB
ABSOLUTE EOS #: <0.03 K/UL (ref 0–0.44)
ABSOLUTE IMMATURE GRANULOCYTE: 0.06 K/UL (ref 0–0.3)
ABSOLUTE LYMPH #: 1.16 K/UL (ref 1.1–3.7)
ABSOLUTE MONO #: 0.76 K/UL (ref 0.1–1.4)
ANION GAP SERPL CALCULATED.3IONS-SCNC: 10 MMOL/L (ref 9–17)
BASOPHILS # BLD: 0 % (ref 0–2)
BASOPHILS ABSOLUTE: <0.03 K/UL (ref 0–0.2)
BUN BLDV-MCNC: 18 MG/DL (ref 6–20)
CALCIUM SERPL-MCNC: 8.9 MG/DL (ref 8.6–10.4)
CHLORIDE BLD-SCNC: 103 MMOL/L (ref 98–107)
CO2: 25 MMOL/L (ref 20–31)
CREAT SERPL-MCNC: 0.71 MG/DL (ref 0.7–1.2)
EOSINOPHILS RELATIVE PERCENT: 0 % (ref 1–4)
GFR AFRICAN AMERICAN: >60 ML/MIN
GFR NON-AFRICAN AMERICAN: >60 ML/MIN
GFR SERPL CREATININE-BSD FRML MDRD: ABNORMAL ML/MIN/{1.73_M2}
GLUCOSE BLD-MCNC: 109 MG/DL (ref 70–99)
HCT VFR BLD CALC: 37.8 % (ref 40.7–50.3)
HEMOGLOBIN: 12.5 G/DL (ref 13–17)
IMMATURE GRANULOCYTES: 1 %
LYMPHOCYTES # BLD: 10 % (ref 25–45)
MCH RBC QN AUTO: 28.9 PG (ref 25.2–33.5)
MCHC RBC AUTO-ENTMCNC: 33.1 G/DL (ref 28.4–34.8)
MCV RBC AUTO: 87.3 FL (ref 82.6–102.9)
MONOCYTES # BLD: 6 % (ref 2–8)
NRBC AUTOMATED: 0 PER 100 WBC
PDW BLD-RTO: 14.1 % (ref 11.8–14.4)
PLATELET # BLD: 318 K/UL (ref 138–453)
PMV BLD AUTO: 11.8 FL (ref 8.1–13.5)
POTASSIUM SERPL-SCNC: 4.1 MMOL/L (ref 3.7–5.3)
RBC # BLD: 4.33 M/UL (ref 4.21–5.77)
SEG NEUTROPHILS: 83 % (ref 34–64)
SEGMENTED NEUTROPHILS ABSOLUTE COUNT: 10.14 K/UL (ref 1.5–8.1)
SODIUM BLD-SCNC: 138 MMOL/L (ref 135–144)
WBC # BLD: 12.1 K/UL (ref 4.5–13.5)

## 2022-09-05 PROCEDURE — 80048 BASIC METABOLIC PNL TOTAL CA: CPT

## 2022-09-05 PROCEDURE — 85025 COMPLETE CBC W/AUTO DIFF WBC: CPT

## 2022-09-05 PROCEDURE — 2580000003 HC RX 258: Performed by: STUDENT IN AN ORGANIZED HEALTH CARE EDUCATION/TRAINING PROGRAM

## 2022-09-05 PROCEDURE — 36415 COLL VENOUS BLD VENIPUNCTURE: CPT

## 2022-09-05 PROCEDURE — 6360000002 HC RX W HCPCS: Performed by: STUDENT IN AN ORGANIZED HEALTH CARE EDUCATION/TRAINING PROGRAM

## 2022-09-05 PROCEDURE — 99239 HOSP IP/OBS DSCHRG MGMT >30: CPT | Performed by: INTERNAL MEDICINE

## 2022-09-05 RX ORDER — AMOXICILLIN AND CLAVULANATE POTASSIUM 875; 125 MG/1; MG/1
1 TABLET, FILM COATED ORAL 2 TIMES DAILY
Qty: 14 TABLET | Refills: 0 | Status: SHIPPED | OUTPATIENT
Start: 2022-09-05 | End: 2022-09-12

## 2022-09-05 RX ORDER — PREDNISONE 10 MG/1
10 TABLET ORAL 2 TIMES DAILY
Qty: 10 TABLET | Refills: 0 | Status: SHIPPED | OUTPATIENT
Start: 2022-09-05 | End: 2022-09-10

## 2022-09-05 RX ADMIN — DEXAMETHASONE SODIUM PHOSPHATE 10 MG: 10 INJECTION INTRAMUSCULAR; INTRAVENOUS at 11:06

## 2022-09-05 RX ADMIN — DEXAMETHASONE SODIUM PHOSPHATE 10 MG: 10 INJECTION INTRAMUSCULAR; INTRAVENOUS at 05:46

## 2022-09-05 RX ADMIN — SODIUM CHLORIDE: 9 INJECTION, SOLUTION INTRAVENOUS at 05:25

## 2022-09-05 RX ADMIN — ENOXAPARIN SODIUM 40 MG: 100 INJECTION SUBCUTANEOUS at 09:07

## 2022-09-05 RX ADMIN — AMPICILLIN SODIUM AND SULBACTAM SODIUM 3000 MG: 2; 1 INJECTION, POWDER, FOR SOLUTION INTRAMUSCULAR; INTRAVENOUS at 05:27

## 2022-09-05 RX ADMIN — SODIUM CHLORIDE, PRESERVATIVE FREE 10 ML: 5 INJECTION INTRAVENOUS at 09:07

## 2022-09-05 RX ADMIN — AMPICILLIN SODIUM AND SULBACTAM SODIUM 3000 MG: 2; 1 INJECTION, POWDER, FOR SOLUTION INTRAMUSCULAR; INTRAVENOUS at 11:07

## 2022-09-05 ASSESSMENT — PAIN SCALES - GENERAL: PAINLEVEL_OUTOF10: 0

## 2022-09-05 NOTE — PLAN OF CARE
Problem: Discharge Planning  Goal: Discharge to home or other facility with appropriate resources  9/5/2022 0734 by Andrez Moore RN  Outcome: Progressing  9/5/2022 0609 by Andrez Moore RN  Outcome: Progressing     Problem: Pain  Goal: Verbalizes/displays adequate comfort level or baseline comfort level  9/5/2022 0734 by Andrez Moore RN  Outcome: Progressing  9/5/2022 0609 by Andrez Moore RN  Outcome: Progressing     Problem: Respiratory - Adult  Goal: Achieves optimal ventilation and oxygenation  9/5/2022 0734 by Andrez Moore RN  Outcome: Progressing  9/5/2022 0609 by Andrez Moore RN  Outcome: Progressing     Problem: Skin/Tissue Integrity  Goal: Absence of new skin breakdown  Description: 1. Monitor for areas of redness and/or skin breakdown  2. Assess vascular access sites hourly  3. Every 4-6 hours minimum:  Change oxygen saturation probe site  4. Every 4-6 hours:  If on nasal continuous positive airway pressure, respiratory therapy assess nares and determine need for appliance change or resting period.   9/5/2022 0734 by Andrez Moore RN  Outcome: Progressing  9/5/2022 0609 by Andrez Moore RN  Outcome: Progressing     Problem: Safety - Adult  Goal: Free from fall injury  9/5/2022 0734 by Andrez Moore RN  Outcome: Progressing  9/5/2022 0609 by Andrez Moore RN  Outcome: Progressing     Problem: ABCDS Injury Assessment  Goal: Absence of physical injury  9/5/2022 0734 by Andrez Moore RN  Outcome: Progressing  9/5/2022 0609 by Andrez Moore RN  Outcome: Progressing     Problem: Nutrition Deficit:  Goal: Optimize nutritional status  9/5/2022 0734 by Andrez Moore RN  Outcome: Progressing  9/5/2022 0609 by Andrez Moore RN  Outcome: Progressing

## 2022-09-05 NOTE — DISCHARGE INSTRUCTIONS
We hope that during your visit, our service was delivered in a professional and caring manner. Please keep [address] in mind as we walk with you down the path to your own personal wellness. If you notice any concerning symptoms please return to the ER immediately. These can include but are not limited to: fevers, chills, shortness of breath, vomiting, weakness of the extremities, changes in your mental status, numbness, pale extremities, or chest pain. Diet: You may resume your normal diet     Activity: resume activity as tolerated. Medications: Continue taking your home medications as previously directed. For pain You may take tylenol 1,000mg by mouth every 6 hours as needed for pain. Do not exceed 4,000mg per day. If you have liver disease don't take tylenol. You may also take ibuprofen 600mg every 6-8 hours as needed for pain. Do not exceed 2,400 mg per day. If you experience stomach pain or you have a history of kidney disease stop taking ibuprofen. You may alternate application of ice and heat 20 minutes at a time as desired. Please take antibiotics and steroids as prescribed. Follow up: Please follow up with your primary care doctor within one week or as needed. Please return to emergency department if you develop any difficulty swallowing or trouble breathing.

## 2022-09-05 NOTE — PROGRESS NOTES
Patient discharged with  instructions. Patients scripts sent to the [facility] on [address]. Rn went over entire room with pt and girlfriend and patient stated he had all his belongings. He took cell phone,  all clothes. Patient re educated on importance of isolation as he is still contagious.

## 2022-09-05 NOTE — DISCHARGE SUMMARY
[address] [place] [address]     Department of Internal Medicine - Critical Care Service    INPATIENT DISCHARGE SUMMARY      PATIENT IDENTIFICATION:  NAME:  Tracie Carrasquillo   :   2000  MRN:    [id number]     Acct:    [de-identified]   Admit Date:  2022  Discharge date:  No discharge date for patient encounter. Attending Provider: Frank Godoy MD                                     Principal Problem:    Epiglottitis  Active Problems:    Acute epiglottitis with airway obstruction    Acute epiglottitis without airway obstruction    Acute respiratory insufficiency  Resolved Problems:    * No resolved hospital problems. *       REASON FOR HOSPITALIZATION:   Chief Complaint   Patient presents with    [address] [facility] is a 24 y.o. with no prior medical history presented to ER with difficulty swallowing which was progressive and was suspected to have epiglottitis. Patient was evaluated by ENT and then subsequently was taken to the OR due to concern of severe epiglottitis requiring endotracheal intubation. Patient was transferred to medical ICU after intubation for further care. He was started on Decadron and Unasyn. He was on propofol and subsequently Versed drip was added. He remained hemodynamically stable on minimal vent settings. Respiratory panel performed, patient PCR positive for COVID.     : Bedside scope performed by ENT. Extubated     Consults:   ENT    Procedures:  Intubated in OR with ENT on .  Extubated on     Any Hospital Acquired Infections: none    PATIENT'S DISCHARGE CONDITION:    STABLE    PATIENT/FAMILY INSTRUCTIONS:   Current Discharge Medication List        START taking these medications    Details   amoxicillin-clavulanate (AUGMENTIN) 875-125 MG per tablet Take 1 tablet by mouth 2 times daily for 7 days  Qty: 14 tablet, Refills: 0      predniSONE (DELTASONE) 10 MG tablet Take 1 tablet by mouth 2 times daily for 5 days  Qty: 10 tablet, Refills: 0           CONTINUE these medications which have NOT CHANGED    Details   acetaminophen (TYLENOL) 500 MG tablet Take 2 tablets by mouth 3 times daily for 7 days  Qty: 42 tablet, Refills: 0      ibuprofen (IBU) 600 MG tablet Take 1 tablet by mouth every 8 hours as needed for Pain  Qty: 21 tablet, Refills: 0           Activity: activity as tolerated    Diet: regular diet    Disposition: home    Follow-up:  in 3 days with VENICE La CNP,      Electronically signed by Kimberley Gutierres DO on 9/5/2022 at 1:28 PM     Time Spent on discharge is more than 15 minutes in the examination, evaluation, counseling and review of medications and discharge plan.       Kimberley Gutierres DO  Emergency Medicine Resident  Critical Care Service

## 2022-09-05 NOTE — PLAN OF CARE
Problem: Discharge Planning  Goal: Discharge to home or other facility with appropriate resources  9/5/2022 1337 by Homar Mehta RN  Outcome: Completed  Flowsheets (Taken 9/5/2022 0800)  Discharge to home or other facility with appropriate resources:   Identify barriers to discharge with patient and caregiver   Arrange for needed discharge resources and transportation as appropriate   Identify discharge learning needs (meds, wound care, etc)   Arrange for interpreters to assist at discharge as needed   Refer to discharge planning if patient needs post-hospital services based on physician order or complex needs related to functional status, cognitive ability or social support system  9/5/2022 0749 by Homar Mehta RN  Outcome: Progressing  9/5/2022 0734 by Indiana Lowery RN  Outcome: Progressing  9/5/2022 0609 by Indiana Lowery RN  Outcome: Progressing     Problem: Pain  Goal: Verbalizes/displays adequate comfort level or baseline comfort level  9/5/2022 1337 by Homar Mehta RN  Outcome: Completed  Flowsheets (Taken 9/5/2022 1200)  Verbalizes/displays adequate comfort level or baseline comfort level:   Encourage patient to monitor pain and request assistance   Assess pain using appropriate pain scale   Administer analgesics based on type and severity of pain and evaluate response   Implement non-pharmacological measures as appropriate and evaluate response   Consider cultural and social influences on pain and pain management   Notify Licensed Independent Practitioner if interventions unsuccessful or patient reports new pain  9/5/2022 0749 by Homar Mehta RN  Outcome: Progressing  9/5/2022 0734 by Indiana Lowery RN  Outcome: Progressing  9/5/2022 0609 by Indiana Lowery RN  Outcome: Progressing     Problem: Safety - Medical Restraint  Goal: Remains free of injury from restraints (Restraint for Interference with Medical Device)  Description: INTERVENTIONS:  1.  Determine that other, less restrictive measures have been tried or would not be effective before applying the restraint  2. Evaluate the patient's condition at the time of restraint application  3. Inform patient/family regarding the reason for restraint  4. Q2H: Monitor safety, psychosocial status, comfort, nutrition and hydration  9/5/2022 0735 by Felicia Issa RN  Outcome: Completed  9/5/2022 0734 by Felicia Issa RN  Outcome: Progressing  9/5/2022 0609 by Felicia Issa RN  Outcome: Progressing     Problem: Respiratory - Adult  Goal: Achieves optimal ventilation and oxygenation  9/5/2022 1337 by Garrick Ly RN  Outcome: Completed  9/5/2022 0749 by Garrick Ly RN  Outcome: Progressing  9/5/2022 0734 by Felicia Issa RN  Outcome: Progressing  9/5/2022 0609 by Felicia Issa RN  Outcome: Progressing     Problem: Skin/Tissue Integrity  Goal: Absence of new skin breakdown  Description: 1. Monitor for areas of redness and/or skin breakdown  2. Assess vascular access sites hourly  3. Every 4-6 hours minimum:  Change oxygen saturation probe site  4. Every 4-6 hours:  If on nasal continuous positive airway pressure, respiratory therapy assess nares and determine need for appliance change or resting period.   9/5/2022 1337 by Garrick Ly RN  Outcome: Completed  9/5/2022 0749 by Garrick Ly RN  Outcome: Progressing  9/5/2022 0734 by Felicia Issa RN  Outcome: Progressing  9/5/2022 0609 by Felicia Issa RN  Outcome: Progressing     Problem: ABCDS Injury Assessment  Goal: Absence of physical injury  9/5/2022 1337 by Garrick Ly RN  Outcome: Completed  9/5/2022 0749 by Garrick Ly RN  Outcome: Progressing  Flowsheets (Taken 9/5/2022 0746)  Absence of Physical Injury: Implement safety measures based on patient assessment  9/5/2022 0734 by Felicia Issa RN  Outcome: Progressing  9/5/2022 0609 by Felicia Issa RN  Outcome: Progressing     Problem: Nutrition Deficit:  Goal: Optimize nutritional status  9/5/2022 1337 by Garrick Ly RN  Outcome: Completed  9/5/2022 0749 by Donald Jose RN  Outcome: Progressing  9/5/2022 0734 by Rosio Bullock RN  Outcome: Progressing  9/5/2022 0609 by Rosio Bullock RN  Outcome: Progressing
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
## 2022-09-06 ENCOUNTER — CARE COORDINATION (OUTPATIENT)
Dept: CASE MANAGEMENT | Age: 22
End: 2022-09-06

## 2022-09-06 DIAGNOSIS — J05.10 EPIGLOTTITIS: Primary | ICD-10-CM

## 2022-09-06 NOTE — CARE COORDINATION
[facility] Transitions Initial Follow Up Call    Call within 2 business days of discharge: Yes    Patient: Kathryn Yost Patient : 2000   MRN: [id number]  Reason for Admission: Acute epiglottitis without airway obstruction  Discharge Date: 22 RARS: Readmission Risk Score: 7.4      Last Discharge [facility]       Date Complaint Diagnosis Description Type Department Provider    22 Pharyngitis Acute epiglottitis without airway obstruction ED to Hosp-Admission (Discharged) (ADMIT) [facility] 3 Mumtaz Quinones MD; yN Najera. .. 22 Pharyngitis Sore throat ED (DISCHARGE) [facility] ED Caity Richardson DO; Jarred Celestinr. .. Spoke with: Quincy Mendoza: [facility]    Was able to contact [address] [employer] for initial [employer]. He stated that he was doing \"good\". He said that he has been eating soft foods without any problems or pain and denied any shortness of breath. He did say that his throat was sore if he swallowed without any food or liquid. He did say that he also had diarrhea. Encouraged him to drink plenty of cold liquids. Reviewed medications and he stated that he had all his medications. He has not made his follow up appointment with PCP yet. He was agreeable to have MA assist with scheduling. Staff message sent to Belinda Canchola MA.  He had no questions or concerns. Non-face-to-face services provided:  Obtained and reviewed discharge summary and/or continuity of care documents  Assessment and support for treatment adherence and medication management-reviewed quarantine guidelines. Transitions of Care Initial Call    Was this an external facility discharge? No Discharge Facility: [facility]    Challenges to be reviewed by the provider   Additional needs identified to be addressed with provider: No  none             Method of communication with provider : none    Advance Care Planning:   Does patient have an Advance Directive:  NO ACP doc, decision maker verified .      Care Transition Nurse contacted the patient by telephone to perform post hospital discharge assessment. Verified name and  with patient as identifiers. Provided introduction to self, and explanation of the CTN role. CTN reviewed discharge instructions, medical action plan and red flags with patient who verbalized understanding. Patient given an opportunity to ask questions and does not have any further questions or concerns at this time. Were discharge instructions available to patient? Yes. Reviewed appropriate site of care based on symptoms and resources available to patient including: PCP  When to call 911. The patient agrees to contact the PCP office for questions related to their healthcare. Medication reconciliation was performed with patient, who verbalizes understanding of administration of home medications. Advised obtaining a 90-day supply of all daily and as-needed medications. Was patient discharged with a pulse oximeter? no    CTN provided contact information. Plan for follow-up call in 5-7 days based on severity of symptoms and risk factors. Plan for next call: symptom management-follow up on Covid symptoms of epiglottitis/diarrhea  follow up appointment-if PCP appointment was made  Final call if doing well        Care Transitions 24 Hour Call    Do you have a copy of your discharge instructions?: Yes  Do you have all of your prescriptions and are they filled?: Yes  Have you been contacted by a [facility] Pharmacist?: No  Have you scheduled your follow up appointment?: No  Do you feel like you have everything you need to keep you well at home?: Yes  Care Transitions Interventions         Follow Up  No future appointments.     Veronica Martins RN

## 2022-09-06 NOTE — CARE COORDINATION
Request from [address] [place] [address], RN.,   Please schedule patient for hospital f/u. Patient scheduled for 9/9 @ 2:30pm    Called patient but call was dropped before time and date given. Called back, LVM regarding time and date.     Johana Hudson, [address]  Care Coordination Transition

## 2022-09-07 LAB
CULTURE: NORMAL
CULTURE: NORMAL
Lab: NORMAL
Lab: NORMAL
SPECIMEN DESCRIPTION: NORMAL
SPECIMEN DESCRIPTION: NORMAL

## 2022-09-12 LAB — GLUCOSE BLD-MCNC: 66 MG/DL (ref 75–110)

## 2022-09-15 PROBLEM — Z00.00 ROUTINE HEALTH MAINTENANCE: Status: RESOLVED | Noted: 2022-08-16 | Resolved: 2022-09-15

## 2023-10-15 ENCOUNTER — HOSPITAL ENCOUNTER (EMERGENCY)
Age: 23
Discharge: HOME | End: 2023-10-15
Attending: EMERGENCY MEDICINE
Payer: COMMERCIAL

## 2023-10-15 VITALS
RESPIRATION RATE: 17 BRPM | DIASTOLIC BLOOD PRESSURE: 81 MMHG | HEIGHT: 73 IN | OXYGEN SATURATION: 98 % | BODY MASS INDEX: 26.5 KG/M2 | WEIGHT: 199.96 LBS | HEART RATE: 75 BPM | TEMPERATURE: 97.7 F | SYSTOLIC BLOOD PRESSURE: 137 MMHG

## 2023-10-15 DIAGNOSIS — A64 STD (SEXUALLY TRANSMITTED DISEASE): Primary | ICD-10-CM

## 2023-10-15 LAB
BILIRUB UR QL STRIP: NEGATIVE
CASTS #/AREA URNS LPF: NORMAL /LPF (ref 0–2)
CASTS #/AREA URNS LPF: NORMAL /LPF (ref 0–2)
CLARITY UR: CLEAR
COLOR UR: YELLOW
EPI CELLS #/AREA URNS HPF: NORMAL /HPF (ref 0–5)
GLUCOSE UR STRIP-MCNC: NEGATIVE MG/DL
HGB UR QL STRIP.AUTO: ABNORMAL
KETONES UR STRIP-MCNC: NEGATIVE MG/DL
LEUKOCYTE ESTERASE UR QL STRIP: ABNORMAL
MUCOUS THREADS URNS QL MICRO: NORMAL
NITRITE UR QL STRIP: NEGATIVE
PH UR STRIP: 6.5 [PH] (ref 5–8)
PROT UR STRIP-MCNC: ABNORMAL MG/DL
RBC #/AREA URNS HPF: NORMAL /HPF (ref 0–2)
SP GR UR STRIP: 1.03 (ref 1–1.03)
UROBILINOGEN UR STRIP-ACNC: NORMAL EU/DL (ref 0–1)
WBC #/AREA URNS HPF: NORMAL /HPF (ref 0–5)

## 2023-10-15 PROCEDURE — 96372 THER/PROPH/DIAG INJ SC/IM: CPT

## 2023-10-15 PROCEDURE — 87591 N.GONORRHOEAE DNA AMP PROB: CPT

## 2023-10-15 PROCEDURE — 6370000000 HC RX 637 (ALT 250 FOR IP): Performed by: EMERGENCY MEDICINE

## 2023-10-15 PROCEDURE — 81001 URINALYSIS AUTO W/SCOPE: CPT

## 2023-10-15 PROCEDURE — 87661 TRICHOMONAS VAGINALIS AMPLIF: CPT

## 2023-10-15 PROCEDURE — 6360000002 HC RX W HCPCS: Performed by: EMERGENCY MEDICINE

## 2023-10-15 PROCEDURE — 99284 EMERGENCY DEPT VISIT MOD MDM: CPT

## 2023-10-15 PROCEDURE — 87086 URINE CULTURE/COLONY COUNT: CPT

## 2023-10-15 PROCEDURE — 87491 CHLMYD TRACH DNA AMP PROBE: CPT

## 2023-10-15 RX ORDER — DOXYCYCLINE HYCLATE 100 MG
100 TABLET ORAL ONCE
Status: COMPLETED | OUTPATIENT
Start: 2023-10-15 | End: 2023-10-15

## 2023-10-15 RX ORDER — DOXYCYCLINE HYCLATE 100 MG
100 TABLET ORAL 2 TIMES DAILY
Qty: 13 TABLET | Refills: 0 | Status: SHIPPED | OUTPATIENT
Start: 2023-10-15 | End: 2023-10-22

## 2023-10-15 RX ORDER — CEFTRIAXONE 1 G/1
1000 INJECTION, POWDER, FOR SOLUTION INTRAMUSCULAR; INTRAVENOUS ONCE
Status: COMPLETED | OUTPATIENT
Start: 2023-10-15 | End: 2023-10-15

## 2023-10-15 RX ADMIN — CEFTRIAXONE SODIUM 1000 MG: 1 INJECTION, POWDER, FOR SOLUTION INTRAMUSCULAR; INTRAVENOUS at 14:10

## 2023-10-15 RX ADMIN — DOXYCYCLINE HYCLATE 100 MG: 100 TABLET, COATED ORAL at 14:10

## 2023-10-15 ASSESSMENT — ENCOUNTER SYMPTOMS
COUGH: 0
ABDOMINAL PAIN: 0
NAUSEA: 0
SHORTNESS OF BREATH: 0
VOMITING: 0

## 2023-10-15 ASSESSMENT — PAIN - FUNCTIONAL ASSESSMENT: PAIN_FUNCTIONAL_ASSESSMENT: 0-10

## 2023-10-15 ASSESSMENT — PAIN DESCRIPTION - LOCATION: LOCATION: FLANK

## 2023-10-15 ASSESSMENT — PAIN SCALES - GENERAL: PAINLEVEL_OUTOF10: 4

## 2023-10-15 ASSESSMENT — PAIN DESCRIPTION - FREQUENCY: FREQUENCY: INTERMITTENT

## 2023-10-15 ASSESSMENT — PAIN DESCRIPTION - DESCRIPTORS: DESCRIPTORS: SHARP

## 2023-10-15 ASSESSMENT — PAIN DESCRIPTION - ORIENTATION: ORIENTATION: RIGHT

## 2023-10-15 NOTE — ED PROVIDER NOTES
[address] ED  Emergency Department Encounter  Emergency Medicine Resident     Pt Jewel Phalen  MRN: [id number]  [facility] [address]  Date of evaluation: 10/15/23  PCP:  Marion Sanchez MD  Note Started: 11:39 AM EDT      [address]       Chief Complaint   Patient presents with    Flank Pain    Hematuria    STD check        HISTORY OF PRESENT ILLNESS  (Location/Symptom, Timing/Onset, Context/Setting, Quality, Duration, Modifying Factors, Severity.)      Carla Valdez is a 25 y.o. male who presents with right flank pain, hematuria, as well as concern for STDs. Patient states his symptoms are today. Patient denies any history of kidney stones previously. Did have episode of hematuria earlier today. Denies any abdominal pain, fevers, chills. Patient does have regular unprotected intercourse. No known positive contacts. Also reporting dysuria. PAST MEDICAL / SURGICAL / SOCIAL / FAMILY HISTORY      has a past medical history of ADD (attention deficit disorder). has a past surgical history that includes Dental surgery (9/23/15); Circumcision; Mouth surgery; and laryngoscopy (N/A, 9/1/2022).       Social History     Socioeconomic History    Marital status: Single     Spouse name: Not on file    Number of children: Not on file    Years of education: Not on file    Highest education level: Not on file   Occupational History    Not on file   Tobacco Use    Smoking status: Never    Smokeless tobacco: Never   Vaping Use    Vaping Use: Some days    Substances: Nicotine    Devices: Disposable   Substance and Sexual Activity    Alcohol use: No     Alcohol/week: 0.0 standard drinks of alcohol    Drug use: No    Sexual activity: Not Currently     Partners: Female   Other Topics Concern    Not on file   Social History Narrative    ** Merged History Encounter **          Social Determinants of Health     Financial Resource Strain: Not on file   Food Insecurity: Not on file   Transportation

## 2023-10-15 NOTE — ED TRIAGE NOTES
Patient presented to the ED today with complaints of blood in the urine, and flank pain. Patient states symptoms presented yesterday.

## 2023-10-16 LAB
CHLAMYDIA DNA UR QL NAA+PROBE: ABNORMAL
MICROORGANISM SPEC CULT: NORMAL
N GONORRHOEA DNA UR QL NAA+PROBE: NEGATIVE
SOURCE: NORMAL
SPECIMEN DESCRIPTION: ABNORMAL
SPECIMEN DESCRIPTION: NORMAL
TRICHOMONAS VAGINALI, MOLECULAR: NEGATIVE

## 2023-10-17 ENCOUNTER — TELEPHONE (OUTPATIENT)
Dept: PHARMACY | Age: 23
End: 2023-10-17

## 2023-10-17 NOTE — TELEPHONE ENCOUNTER
CLINICAL PHARMACY NOTE:  Telephone Follow-up for Positive STD Test    At the time of Melia Ellison's visit to [address] [place] [address] Emergency Department on October 15, 2023 STD testing was performed. DNA testing was positive for Chlamydia. While in the ED, patient was given doxycycline 100mg orally x 1 dose and ceftriaxone 1000mg IM x 1 dose. Treatment appropriate, no follow up needed at this time.

## 2024-08-28 ENCOUNTER — HOSPITAL ENCOUNTER (EMERGENCY)
Age: 24
Discharge: HOME | End: 2024-08-28
Attending: EMERGENCY MEDICINE
Payer: OTHER MISCELLANEOUS

## 2024-08-28 ENCOUNTER — APPOINTMENT (OUTPATIENT)
Dept: GENERAL RADIOLOGY | Age: 24
End: 2024-08-28
Payer: OTHER MISCELLANEOUS

## 2024-08-28 VITALS
TEMPERATURE: 97.3 F | BODY MASS INDEX: 27.04 KG/M2 | OXYGEN SATURATION: 100 % | HEIGHT: 73 IN | RESPIRATION RATE: 16 BRPM | DIASTOLIC BLOOD PRESSURE: 86 MMHG | SYSTOLIC BLOOD PRESSURE: 158 MMHG | WEIGHT: 204 LBS | HEART RATE: 73 BPM
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
## 2024-08-28 DIAGNOSIS — S20.212A RIB CONTUSION, LEFT, INITIAL ENCOUNTER: ICD-10-CM

## 2024-08-28 PROCEDURE — 6370000000 HC RX 637 (ALT 250 FOR IP): Performed by: EMERGENCY MEDICINE

## 2024-08-28 PROCEDURE — 99283 EMERGENCY DEPT VISIT LOW MDM: CPT

## 2024-08-28 PROCEDURE — 71101 X-RAY EXAM UNILAT RIBS/CHEST: CPT

## 2024-08-28 RX ORDER — ACETAMINOPHEN 500 MG
1000 TABLET ORAL ONCE
Status: COMPLETED | OUTPATIENT
Start: 2024-08-28 | End: 2024-08-28

## 2024-08-28 RX ORDER — IBUPROFEN 600 MG/1
600 TABLET, FILM COATED ORAL EVERY 8 HOURS PRN
Qty: 21 TABLET | Refills: 0 | Status: SHIPPED | OUTPATIENT
Start: 2024-08-28 | End: 2024-09-04

## 2024-08-28 RX ORDER — ACETAMINOPHEN 500 MG
1000 TABLET ORAL 3 TIMES DAILY
Qty: 42 TABLET | Refills: 0 | Status: SHIPPED | OUTPATIENT
Start: 2024-08-28 | End: 2024-09-04

## 2024-08-28 RX ADMIN — ACETAMINOPHEN 1000 MG: 500 TABLET ORAL at 09:54

## 2024-08-28 ASSESSMENT — ENCOUNTER SYMPTOMS
DIARRHEA: 0
COUGH: 0
NAUSEA: 0
SHORTNESS OF BREATH: 0
VOMITING: 0
ABDOMINAL PAIN: 0
BACK PAIN: 0
SORE THROAT: 0

## 2024-08-28 ASSESSMENT — PAIN DESCRIPTION - LOCATION: LOCATION: BACK

## 2024-08-28 ASSESSMENT — PAIN DESCRIPTION - ORIENTATION: ORIENTATION: LEFT;MID;UPPER

## 2024-08-28 ASSESSMENT — PAIN SCALES - GENERAL: PAINLEVEL_OUTOF10: 10

## 2024-08-28 ASSESSMENT — PAIN - FUNCTIONAL ASSESSMENT: PAIN_FUNCTIONAL_ASSESSMENT: 0-10
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
## (undated) DEVICE — CONTAINER,SPECIMEN,4OZ,OR STRL: Brand: MEDLINE

## (undated) DEVICE — INTENDED FOR TISSUE SEPARATION, AND OTHER PROCEDURES THAT REQUIRE A SHARP SURGICAL BLADE TO PUNCTURE OR CUT.: Brand: BARD-PARKER ® CARBON RIB-BACK BLADES

## (undated) DEVICE — KIT,ANTI FOG,W/SPONGE & FLUID,SOFT PACK: Brand: MEDLINE

## (undated) DEVICE — GOWN,AURORA,NONREINFORCED,LARGE: Brand: MEDLINE

## (undated) DEVICE — SVMMC HD AND NK PK